# Patient Record
Sex: FEMALE | Race: WHITE | Employment: OTHER | URBAN - METROPOLITAN AREA
[De-identification: names, ages, dates, MRNs, and addresses within clinical notes are randomized per-mention and may not be internally consistent; named-entity substitution may affect disease eponyms.]

---

## 2017-01-10 RX ORDER — POTASSIUM CHLORIDE 750 MG/1
TABLET, EXTENDED RELEASE ORAL
Qty: 180 TABLET | Refills: 1 | Status: SHIPPED | OUTPATIENT
Start: 2017-01-10 | End: 2017-01-27 | Stop reason: ALTCHOICE

## 2017-01-13 RX ORDER — SIMVASTATIN 10 MG
TABLET ORAL
Qty: 90 TABLET | Refills: 1 | Status: SHIPPED | OUTPATIENT
Start: 2017-01-13 | End: 2017-03-21

## 2017-01-19 ENCOUNTER — PATIENT OUTREACH (OUTPATIENT)
Dept: FAMILY MEDICINE CLINIC | Facility: CLINIC | Age: 67
End: 2017-01-19

## 2017-01-19 DIAGNOSIS — E11.9 TYPE 2 DIABETES MELLITUS WITHOUT COMPLICATION, WITHOUT LONG-TERM CURRENT USE OF INSULIN (HCC): Primary | ICD-10-CM

## 2017-01-27 ENCOUNTER — APPOINTMENT (OUTPATIENT)
Dept: LAB | Age: 67
End: 2017-01-27
Attending: FAMILY MEDICINE
Payer: MEDICARE

## 2017-01-27 ENCOUNTER — OFFICE VISIT (OUTPATIENT)
Dept: FAMILY MEDICINE CLINIC | Facility: CLINIC | Age: 67
End: 2017-01-27

## 2017-01-27 VITALS
HEART RATE: 70 BPM | BODY MASS INDEX: 38 KG/M2 | TEMPERATURE: 98 F | SYSTOLIC BLOOD PRESSURE: 120 MMHG | WEIGHT: 222 LBS | OXYGEN SATURATION: 98 % | RESPIRATION RATE: 16 BRPM | DIASTOLIC BLOOD PRESSURE: 82 MMHG

## 2017-01-27 DIAGNOSIS — J00 ACUTE NASOPHARYNGITIS: Primary | ICD-10-CM

## 2017-01-27 DIAGNOSIS — R04.0 EPISTAXIS: ICD-10-CM

## 2017-01-27 DIAGNOSIS — E11.9 TYPE 2 DIABETES MELLITUS WITHOUT COMPLICATION, WITHOUT LONG-TERM CURRENT USE OF INSULIN (HCC): ICD-10-CM

## 2017-01-27 LAB
ALBUMIN SERPL-MCNC: 3.8 G/DL (ref 3.5–4.8)
ALP LIVER SERPL-CCNC: 82 U/L (ref 55–142)
ALT SERPL-CCNC: 27 U/L (ref 14–54)
AST SERPL-CCNC: 17 U/L (ref 15–41)
BILIRUB SERPL-MCNC: 0.4 MG/DL (ref 0.1–2)
BUN BLD-MCNC: 20 MG/DL (ref 8–20)
CALCIUM BLD-MCNC: 9.3 MG/DL (ref 8.3–10.3)
CHLORIDE: 104 MMOL/L (ref 101–111)
CHOLEST SMN-MCNC: 155 MG/DL (ref ?–200)
CO2: 24 MMOL/L (ref 22–32)
CREAT BLD-MCNC: 0.95 MG/DL (ref 0.55–1.02)
CREAT UR-SCNC: 110 MG/DL
EST. AVERAGE GLUCOSE BLD GHB EST-MCNC: 163 MG/DL (ref 68–126)
GLUCOSE BLD-MCNC: 108 MG/DL (ref 70–99)
HBA1C MFR BLD HPLC: 7.3 % (ref ?–5.7)
HDLC SERPL-MCNC: 59 MG/DL (ref 45–?)
HDLC SERPL: 2.63 {RATIO} (ref ?–4.44)
LDLC SERPL CALC-MCNC: 75 MG/DL (ref ?–130)
M PROTEIN MFR SERPL ELPH: 7.6 G/DL (ref 6.1–8.3)
MICROALBUMIN UR-MCNC: 1 MG/DL
MICROALBUMIN/CREAT 24H UR-RTO: 9.1 UG/MG (ref ?–30)
NONHDLC SERPL-MCNC: 96 MG/DL (ref ?–130)
POTASSIUM SERPL-SCNC: 4 MMOL/L (ref 3.6–5.1)
SODIUM SERPL-SCNC: 139 MMOL/L (ref 136–144)
TRIGLYCERIDES: 107 MG/DL (ref ?–150)
VLDL: 21 MG/DL (ref 5–40)

## 2017-01-27 PROCEDURE — 82043 UR ALBUMIN QUANTITATIVE: CPT

## 2017-01-27 PROCEDURE — 82570 ASSAY OF URINE CREATININE: CPT

## 2017-01-27 PROCEDURE — 36415 COLL VENOUS BLD VENIPUNCTURE: CPT

## 2017-01-27 PROCEDURE — 99213 OFFICE O/P EST LOW 20 MIN: CPT | Performed by: NURSE PRACTITIONER

## 2017-01-27 PROCEDURE — 80053 COMPREHEN METABOLIC PANEL: CPT

## 2017-01-27 PROCEDURE — 83036 HEMOGLOBIN GLYCOSYLATED A1C: CPT

## 2017-01-27 PROCEDURE — 80061 LIPID PANEL: CPT

## 2017-01-27 RX ORDER — SERTRALINE HYDROCHLORIDE 25 MG/1
TABLET, FILM COATED ORAL
Qty: 90 TABLET | Refills: 1 | Status: SHIPPED | OUTPATIENT
Start: 2017-01-27 | End: 2017-03-21

## 2017-01-27 RX ORDER — OMEPRAZOLE 20 MG/1
CAPSULE, DELAYED RELEASE ORAL
COMMUNITY
Start: 2017-01-23 | End: 2017-03-21

## 2017-01-27 RX ORDER — CLOPIDOGREL BISULFATE 75 MG/1
TABLET ORAL
Qty: 90 TABLET | Refills: 1 | Status: SHIPPED | OUTPATIENT
Start: 2017-01-27 | End: 2017-11-01

## 2017-01-27 NOTE — PATIENT INSTRUCTIONS
· Please stop Cold and Sinus medication as this is too drying to the nose. · Use Saline nasal spray to help rinse bloody drainage from the nose. · Use Vaseline on a qtip to help keep nares hydrated.  Use humidifier in the bedroom to help hydrate while sle

## 2017-01-27 NOTE — PROGRESS NOTES
HPI:   Hi Nunez is a 78year old female who presents with ill symptoms for  2  weeks. Patient reports cold symptoms started two weeks ago, have improved, but was using Advil cold and sinus medications every day.  She became alarmed as she began to [facility] Unspecified disorder of thyroid    • Seizure disorder ([facility] [address]    • Back problem    • Varicose veins           Past Surgical History    OTHER SURGICAL HISTORY      Comment right shoulder sx    INJECTION, W/WO CONTRAST, DX/THERAPEUTIC SUBSTANCE, EPIDURAL/SUBAR CA OF LARYNX   • Seizure Disorder Son         Smoking Status: Never Smoker                      Smokeless Status: Never Used                        Alcohol Use: Yes                Comment: 3-4 drinks per month        REVIEW OF SYSTEMS:   GENERAL: feels

## 2017-01-30 DIAGNOSIS — E11.9 TYPE 2 DIABETES MELLITUS WITHOUT COMPLICATION, WITHOUT LONG-TERM CURRENT USE OF INSULIN (HCC): ICD-10-CM

## 2017-01-30 NOTE — TELEPHONE ENCOUNTER
From: Yasmany Lundberg  To: Otoniel Soares MD  Sent: 1/30/2017 11:39 AM CST  Subject: Medication Renewal Request    Original authorizing provider: MD Yasmany Carranza would like a refill of the following medications:  Glucose Blood (ONETOUCH ULT

## 2017-02-06 RX ORDER — LEVOTHYROXINE SODIUM 0.05 MG/1
TABLET ORAL
Qty: 90 TABLET | Refills: 1 | Status: SHIPPED | OUTPATIENT
Start: 2017-02-06 | End: 2017-03-21

## 2017-02-07 RX ORDER — GLIPIZIDE 10 MG/1
TABLET, FILM COATED, EXTENDED RELEASE ORAL
Qty: 90 TABLET | Refills: 0 | Status: SHIPPED | OUTPATIENT
Start: 2017-02-07 | End: 2017-04-08

## 2017-02-07 RX ORDER — VALSARTAN AND HYDROCHLOROTHIAZIDE 160; 25 MG/1; MG/1
TABLET ORAL
Qty: 90 TABLET | Refills: 0 | Status: SHIPPED | OUTPATIENT
Start: 2017-02-07 | End: 2017-04-08

## 2017-03-21 ENCOUNTER — OFFICE VISIT (OUTPATIENT)
Dept: FAMILY MEDICINE CLINIC | Facility: CLINIC | Age: 67
End: 2017-03-21

## 2017-03-21 VITALS
OXYGEN SATURATION: 98 % | WEIGHT: 227 LBS | HEART RATE: 70 BPM | DIASTOLIC BLOOD PRESSURE: 80 MMHG | RESPIRATION RATE: 16 BRPM | BODY MASS INDEX: 39 KG/M2 | SYSTOLIC BLOOD PRESSURE: 112 MMHG

## 2017-03-21 DIAGNOSIS — R53.82 CHRONIC FATIGUE: ICD-10-CM

## 2017-03-21 DIAGNOSIS — R39.15 URINARY URGENCY: ICD-10-CM

## 2017-03-21 DIAGNOSIS — E11.9 TYPE 2 DIABETES MELLITUS WITHOUT COMPLICATION, WITHOUT LONG-TERM CURRENT USE OF INSULIN (HCC): Primary | ICD-10-CM

## 2017-03-21 DIAGNOSIS — E03.9 HYPOTHYROIDISM, UNSPECIFIED TYPE: ICD-10-CM

## 2017-03-21 DIAGNOSIS — Z12.31 ENCOUNTER FOR SCREENING MAMMOGRAM FOR MALIGNANT NEOPLASM OF BREAST: ICD-10-CM

## 2017-03-21 PROCEDURE — 99214 OFFICE O/P EST MOD 30 MIN: CPT | Performed by: FAMILY MEDICINE

## 2017-03-21 RX ORDER — LEVOTHYROXINE SODIUM 0.07 MG/1
75 TABLET ORAL
Qty: 90 TABLET | Refills: 0 | Status: SHIPPED | OUTPATIENT
Start: 2017-03-21 | End: 2017-04-21

## 2017-03-21 RX ORDER — ASCORBIC ACID 500 MG
500 TABLET ORAL DAILY
COMMUNITY

## 2017-03-21 NOTE — PROGRESS NOTES
[facility] Family [facility] Office Note  Chief Complaint:   Patient presents with:  Diabetic Flu: discuss weight gain and c/o fatigue       HPI:   This is a 78year old female coming in for  HPI  Follow up for diabetes.   Pt has been checking feet 06/10/2014 6.7*   02/18/2014 6.6*   11/25/2013 6.3*   ----------              Past Medical History   Diagnosis Date   • Type II or unspecified type diabetes mellitus without mention of complication, not stated as uncontrolled    • Other and unspecified h POSSIBLE BIOPSY, POSSIBLE POLYPECTOMY 07734;  Surgeon: Kayley Berry MD;  Location: [facility]    COLONOSCOPY N/A 12/13/2016    Comment Procedure: COLONOSCOPY, POSSIBLE BIOPSY, POSSIBLE POLYPECTOMY 60736;  Surgeon: Kayley Berry MD;  Location: [facility] [DISCONTINUED] SIMVASTATIN 10 MG Oral Tab TAKE 1 TABLET EVERY NIGHT Disp: 90 tablet Rfl: 1      Counseling given: Not Answered       REVIEW OF SYSTEMS:   Review of Systems   Constitutional: Negative for fever and chills.    HENT: Negative for rhinorrhea a well.      ASSESSMENT AND PLAN:   1. Type 2 diabetes mellitus without complication, without long-term current use of insulin (HCC)  - Hemoglobin A1C [E];  Future  Foot exam completed  Pt has eye exam scheduled  Pt wants to try more aggressive weight loss pr

## 2017-03-22 RX ORDER — OMEPRAZOLE 20 MG/1
CAPSULE, DELAYED RELEASE ORAL
Qty: 180 CAPSULE | Refills: 5 | Status: SHIPPED | OUTPATIENT
Start: 2017-03-22 | End: 2018-05-03

## 2017-03-27 ENCOUNTER — PATIENT MESSAGE (OUTPATIENT)
Dept: FAMILY MEDICINE CLINIC | Facility: CLINIC | Age: 67
End: 2017-03-27

## 2017-03-28 NOTE — TELEPHONE ENCOUNTER
From: Raleigh Gan  To: Chalino Reynaga MD  Sent: 3/27/2017 10:50 AM CDT  Subject: Visit Melissa Albert,     I started taking the sample you gave me to try of Myrbetriq and it worked right away, no accidents at all these days.  So a pres

## 2017-03-30 ENCOUNTER — PATIENT OUTREACH (OUTPATIENT)
Dept: CASE MANAGEMENT | Age: 67
End: 2017-03-30

## 2017-03-30 RX ORDER — ACETAMINOPHEN AND CODEINE PHOSPHATE 300; 30 MG/1; MG/1
1 TABLET ORAL EVERY 8 HOURS PRN
Qty: 30 TABLET | Refills: 0 | Status: SHIPPED
Start: 2017-03-30 | End: 2017-08-29

## 2017-03-31 ENCOUNTER — OFFICE VISIT (OUTPATIENT)
Dept: FAMILY MEDICINE CLINIC | Facility: CLINIC | Age: 67
End: 2017-03-31

## 2017-03-31 ENCOUNTER — HOSPITAL ENCOUNTER (OUTPATIENT)
Dept: GENERAL RADIOLOGY | Age: 67
Discharge: HOME | End: 2017-03-31
Attending: FAMILY MEDICINE
Payer: MEDICARE

## 2017-03-31 VITALS
HEART RATE: 74 BPM | DIASTOLIC BLOOD PRESSURE: 90 MMHG | SYSTOLIC BLOOD PRESSURE: 140 MMHG | RESPIRATION RATE: 16 BRPM | OXYGEN SATURATION: 99 % | TEMPERATURE: 98 F

## 2017-03-31 DIAGNOSIS — M54.16 LUMBAR RADICULITIS: Primary | ICD-10-CM

## 2017-03-31 DIAGNOSIS — M53.3 DISORDER OF SI (SACROILIAC) JOINT: ICD-10-CM

## 2017-03-31 PROCEDURE — 72202 X-RAY EXAM SI JOINTS 3/> VWS: CPT

## 2017-03-31 PROCEDURE — 99214 OFFICE O/P EST MOD 30 MIN: CPT | Performed by: FAMILY MEDICINE

## 2017-03-31 RX ORDER — GABAPENTIN 300 MG/1
300 CAPSULE ORAL 3 TIMES DAILY
Qty: 90 CAPSULE | Refills: 0 | Status: SHIPPED | OUTPATIENT
Start: 2017-03-31 | End: 2017-04-24

## 2017-03-31 RX ORDER — GABAPENTIN 300 MG/1
300 CAPSULE ORAL 3 TIMES DAILY
Qty: 90 CAPSULE | Refills: 0 | Status: SHIPPED | OUTPATIENT
Start: 2017-03-31 | End: 2017-03-31

## 2017-03-31 RX ORDER — POTASSIUM CHLORIDE 750 MG/1
10 TABLET, EXTENDED RELEASE ORAL DAILY
COMMUNITY
Start: 2017-03-27 | End: 2017-04-12

## 2017-03-31 RX ORDER — PREDNISONE 20 MG/1
40 TABLET ORAL DAILY
Qty: 10 TABLET | Refills: 0 | Status: SHIPPED | OUTPATIENT
Start: 2017-03-31 | End: 2017-04-05

## 2017-03-31 NOTE — PROGRESS NOTES
[facility] Office Note  Chief Complaint:   Patient presents with:  Back Pain: lower back pain and travels down into right thigh x 1week      HPI:   This is a 78year old female coming in for  HPI  The patient complaints of back pa Comment L3-L5 PPS TLIF     UPPER GI ENDOSCOPY,BIOPSY N/A 5/25/2016    Comment Procedure: ESOPHAGOGASTRODUODENOSCOPY, POSSIBLE BIOPSY, POSSIBLE POLYPECTOMY 55094;  Surgeon: Jennifer Granger MD;  Location: [facility]    COLONOSCOPY FLX W/ENDOSCOPIC MUCOSAL R Levothyroxine Sodium (LEVOTHROID) 75 MCG Oral Tab Take 1 tablet (75 mcg total) by mouth before breakfast. Disp: 90 tablet Rfl: 0   Mirabegron ER (MYRBETRIQ) 25 MG Oral Tablet 24 Hr Take 1 tablet (25 mg total) by mouth daily.  Disp: 14 tablet Rfl: 0   VALSAR /90 mmHg  Pulse 74  Temp(Src) 98 °F (36.7 °C) (Oral)  Resp 16  SpO2 99% Estimated body mass index is 38.95 kg/(m^2) as calculated from the following:    Height as of 12/13/16: 64\". Weight as of 3/21/17: 227 lb. Vital signs reviewed. Appears stat Lumbar spondylosis     Lumbar stenosis     Spondylolisthesis of lumbar region     Lumbar radiculitis     UTI (lower urinary tract infection)     HTN (hypertension)     Seizure disorder (HCC)     DM (diabetes mellitus) ([address]     Hyperlipidemia

## 2017-04-10 RX ORDER — VALSARTAN AND HYDROCHLOROTHIAZIDE 160; 25 MG/1; MG/1
TABLET ORAL
Qty: 90 TABLET | Refills: 0 | Status: SHIPPED | OUTPATIENT
Start: 2017-04-10 | End: 2017-10-09

## 2017-04-10 RX ORDER — GLIPIZIDE 10 MG/1
TABLET, FILM COATED, EXTENDED RELEASE ORAL
Qty: 90 TABLET | Refills: 0 | Status: SHIPPED | OUTPATIENT
Start: 2017-04-10 | End: 2017-09-13

## 2017-04-12 ENCOUNTER — PATIENT OUTREACH (OUTPATIENT)
Dept: CASE MANAGEMENT | Age: 67
End: 2017-04-12

## 2017-04-12 DIAGNOSIS — E11.9 TYPE 2 DIABETES MELLITUS WITHOUT COMPLICATION, WITHOUT LONG-TERM CURRENT USE OF INSULIN (HCC): Primary | ICD-10-CM

## 2017-04-12 DIAGNOSIS — I10 ESSENTIAL HYPERTENSION: ICD-10-CM

## 2017-04-12 DIAGNOSIS — E78.5 HYPERLIPIDEMIA, UNSPECIFIED HYPERLIPIDEMIA TYPE: ICD-10-CM

## 2017-04-12 PROCEDURE — 99490 CHRNC CARE MGMT STAFF 1ST 20: CPT

## 2017-04-12 NOTE — PROGRESS NOTES
4/12/2017  Spoke to Tiffany at length about CCM, current care plan and performed CCM assessment with Tiffany reviewed meds and compliance.  Reviewed pt Type 2 Diabetes, HTN  Health Maintenance: mammogram-due this year, colonoscopy- May and December 2016, Due

## 2017-04-19 ENCOUNTER — HOSPITAL ENCOUNTER (OUTPATIENT)
Dept: MAMMOGRAPHY | Age: 67
Discharge: HOME | End: 2017-04-19
Attending: FAMILY MEDICINE
Payer: MEDICARE

## 2017-04-19 DIAGNOSIS — Z12.31 ENCOUNTER FOR SCREENING MAMMOGRAM FOR MALIGNANT NEOPLASM OF BREAST: ICD-10-CM

## 2017-04-19 PROCEDURE — 77067 SCR MAMMO BI INCL CAD: CPT

## 2017-04-21 RX ORDER — LEVOTHYROXINE SODIUM 0.07 MG/1
TABLET ORAL
Qty: 90 TABLET | Refills: 0 | Status: SHIPPED | OUTPATIENT
Start: 2017-04-21 | End: 2017-07-26

## 2017-04-24 RX ORDER — GABAPENTIN 300 MG/1
CAPSULE ORAL
Qty: 90 CAPSULE | Refills: 1 | Status: SHIPPED | OUTPATIENT
Start: 2017-04-24 | End: 2017-05-16

## 2017-05-05 ENCOUNTER — APPOINTMENT (OUTPATIENT)
Dept: LAB | Age: 67
End: 2017-05-05
Attending: FAMILY MEDICINE
Payer: MEDICARE

## 2017-05-05 DIAGNOSIS — E03.9 HYPOTHYROIDISM, UNSPECIFIED TYPE: ICD-10-CM

## 2017-05-05 DIAGNOSIS — R53.82 CHRONIC FATIGUE: ICD-10-CM

## 2017-05-05 DIAGNOSIS — E11.9 TYPE 2 DIABETES MELLITUS WITHOUT COMPLICATION, WITHOUT LONG-TERM CURRENT USE OF INSULIN (HCC): ICD-10-CM

## 2017-05-05 PROCEDURE — 82607 VITAMIN B-12: CPT

## 2017-05-05 PROCEDURE — 84439 ASSAY OF FREE THYROXINE: CPT

## 2017-05-05 PROCEDURE — 84443 ASSAY THYROID STIM HORMONE: CPT

## 2017-05-05 PROCEDURE — 83036 HEMOGLOBIN GLYCOSYLATED A1C: CPT

## 2017-05-12 ENCOUNTER — PATIENT OUTREACH (OUTPATIENT)
Dept: CASE MANAGEMENT | Age: 67
End: 2017-05-12

## 2017-05-15 ENCOUNTER — PATIENT OUTREACH (OUTPATIENT)
Dept: CASE MANAGEMENT | Age: 67
End: 2017-05-15

## 2017-05-15 DIAGNOSIS — E78.5 HYPERLIPIDEMIA, UNSPECIFIED HYPERLIPIDEMIA TYPE: ICD-10-CM

## 2017-05-15 DIAGNOSIS — I10 ESSENTIAL HYPERTENSION: ICD-10-CM

## 2017-05-15 DIAGNOSIS — E11.9 TYPE 2 DIABETES MELLITUS WITHOUT COMPLICATION, WITHOUT LONG-TERM CURRENT USE OF INSULIN (HCC): Primary | ICD-10-CM

## 2017-05-15 PROCEDURE — 99490 CHRNC CARE MGMT STAFF 1ST 20: CPT

## 2017-05-15 NOTE — PROGRESS NOTES
Patient returned my call. Patient stated that she is upset with her A1C and her weight. I asked patient if she had looked through the information I had sent her and she mentioned that she did glimpse through some of it.  I asked patient if she did any type

## 2017-05-16 ENCOUNTER — OFFICE VISIT (OUTPATIENT)
Dept: FAMILY MEDICINE CLINIC | Facility: CLINIC | Age: 67
End: 2017-05-16

## 2017-05-16 VITALS
WEIGHT: 221 LBS | BODY MASS INDEX: 38 KG/M2 | DIASTOLIC BLOOD PRESSURE: 80 MMHG | RESPIRATION RATE: 16 BRPM | OXYGEN SATURATION: 98 % | HEART RATE: 80 BPM | SYSTOLIC BLOOD PRESSURE: 130 MMHG

## 2017-05-16 DIAGNOSIS — E78.5 HYPERLIPIDEMIA, UNSPECIFIED HYPERLIPIDEMIA TYPE: ICD-10-CM

## 2017-05-16 DIAGNOSIS — E53.8 VITAMIN B12 DEFICIENCY: ICD-10-CM

## 2017-05-16 DIAGNOSIS — M79.671 PAIN IN BOTH FEET: ICD-10-CM

## 2017-05-16 DIAGNOSIS — M79.672 PAIN IN BOTH FEET: ICD-10-CM

## 2017-05-16 DIAGNOSIS — E11.9 TYPE 2 DIABETES MELLITUS WITHOUT COMPLICATION, WITHOUT LONG-TERM CURRENT USE OF INSULIN (HCC): Primary | ICD-10-CM

## 2017-05-16 DIAGNOSIS — G89.29 CHRONIC RIGHT-SIDED LOW BACK PAIN WITHOUT SCIATICA: ICD-10-CM

## 2017-05-16 DIAGNOSIS — M54.50 CHRONIC RIGHT-SIDED LOW BACK PAIN WITHOUT SCIATICA: ICD-10-CM

## 2017-05-16 PROCEDURE — 99214 OFFICE O/P EST MOD 30 MIN: CPT | Performed by: FAMILY MEDICINE

## 2017-05-16 PROCEDURE — 96372 THER/PROPH/DIAG INJ SC/IM: CPT | Performed by: FAMILY MEDICINE

## 2017-05-16 RX ORDER — CYANOCOBALAMIN 1000 UG/ML
1000 INJECTION INTRAMUSCULAR; SUBCUTANEOUS ONCE
Status: COMPLETED | OUTPATIENT
Start: 2017-05-16 | End: 2017-05-16

## 2017-05-16 RX ORDER — SIMVASTATIN 10 MG
10 TABLET ORAL NIGHTLY
COMMUNITY
Start: 2017-04-05 | End: 2017-08-27

## 2017-05-16 RX ADMIN — CYANOCOBALAMIN 1000 MCG: 1000 INJECTION INTRAMUSCULAR; SUBCUTANEOUS at 12:01:00

## 2017-05-16 NOTE — PROGRESS NOTES
[facility] Family Medicine Office Note  Chief Complaint:   Patient presents with:  Diabetic Flu: DM follow up go over lab results       HPI:   This is a 78year old female coming in for  HPI  Diabetes  A1c increased.  States she does not want to b Fawad Stevens NDL/CATH SPI DX/THER NJX  7/18/2013    Comment Procedure: LUMBAR / TRANSFORAMINAL EPIDURAL STEROID INJECTION;  Surgeon: Alize Jarvis MD;  Location: [address] [place] [address]&STRIPPING [address] [place] [address] right leg 1 TABLET (75 MCG TOTAL) BY MOUTH BEFORE BREAKFAST.  Disp: 90 tablet Rfl: 0   GLIPIZIDE ER 10 MG Oral Tablet 24 Hr TAKE 1 TABLET EVERY DAY Disp: 90 tablet Rfl: 0   VALSARTAN-HYDROCHLOROTHIAZIDE 160-25 MG Oral Tab TAKE 1 TABLET EVERY DAY Disp: 90 tablet Rfl: 64\".    Weight as of this encounter: 221 lb. Vital signs reviewed. Appears stated age, well groomed. Physical Exam   Constitutional: She is oriented to person, place, and time. She appears well-developed and well-nourished.    HENT:   Mouth/Throat: Oroph verbalizes understanding. Patient is notified to call with any questions, complications, allergies, or worsening or changing symptoms. Patient is to call with any side effects or complications from the treatments as a result of today.      Problem List:  P

## 2017-05-19 ENCOUNTER — TELEPHONE (OUTPATIENT)
Dept: FAMILY MEDICINE CLINIC | Facility: CLINIC | Age: 67
End: 2017-05-19

## 2017-06-07 PROBLEM — M79.675 PAIN IN TOES OF BOTH FEET: Status: ACTIVE | Noted: 2017-06-07

## 2017-06-07 PROBLEM — B35.1 ONYCHOMYCOSIS OF TOENAIL: Status: ACTIVE | Noted: 2017-06-07

## 2017-06-07 PROBLEM — L84 TYLOMA: Status: ACTIVE | Noted: 2017-06-07

## 2017-06-07 PROBLEM — M79.674 PAIN IN TOES OF BOTH FEET: Status: ACTIVE | Noted: 2017-06-07

## 2017-06-07 PROBLEM — E11.51 DIABETES MELLITUS WITH PERIPHERAL CIRCULATORY DISORDER (HCC): Status: ACTIVE | Noted: 2017-06-07

## 2017-06-09 ENCOUNTER — PATIENT OUTREACH (OUTPATIENT)
Dept: CASE MANAGEMENT | Age: 67
End: 2017-06-09

## 2017-06-12 ENCOUNTER — PATIENT OUTREACH (OUTPATIENT)
Dept: CASE MANAGEMENT | Age: 67
End: 2017-06-12

## 2017-06-12 ENCOUNTER — TELEPHONE (OUTPATIENT)
Dept: FAMILY MEDICINE CLINIC | Facility: CLINIC | Age: 67
End: 2017-06-12

## 2017-06-12 DIAGNOSIS — E11.51 DIABETES MELLITUS WITH PERIPHERAL CIRCULATORY DISORDER (HCC): Primary | ICD-10-CM

## 2017-06-12 DIAGNOSIS — E78.5 HYPERLIPIDEMIA, UNSPECIFIED HYPERLIPIDEMIA TYPE: ICD-10-CM

## 2017-06-12 DIAGNOSIS — G25.0 ESSENTIAL TREMOR: ICD-10-CM

## 2017-06-12 PROCEDURE — 99490 CHRNC CARE MGMT STAFF 1ST 20: CPT

## 2017-06-12 RX ORDER — MONTELUKAST SODIUM 10 MG/1
10 TABLET ORAL NIGHTLY
Qty: 30 TABLET | Refills: 0 | Status: SHIPPED | OUTPATIENT
Start: 2017-06-12 | End: 2017-07-03

## 2017-06-12 NOTE — PROGRESS NOTES
I called and spoke with patient regarding information I had mailed her. Patient states that she really like the information and has been doing the recommendations.  She said it's a little hard because she is used to eating so much food and cutting back is n

## 2017-06-12 NOTE — TELEPHONE ENCOUNTER
Patient has been dealing with allergies for a little over a week now. She has tried OTC allegra, andrea seltzer, and a generic nasal spray. She states that nothing is working. Patient is not experiencing green mucous. No fevers or sinus pressure/pain.  Patien

## 2017-06-12 NOTE — TELEPHONE ENCOUNTER
Trial of singulair, 10mg daily. Disp #30  Continue otc allegra. It may take a few days - weeks for symptoms to improve.

## 2017-06-12 NOTE — PROGRESS NOTES
PCP wants patient to continue with OTC allegra and adding singulair 10 mg daily. Patient notified of PCP instructions. Medication was sent to pharmacy.  Patient states understanding    Time spent collecting and reviewing patient data, coordination of care 2

## 2017-06-15 ENCOUNTER — NURSE ONLY (OUTPATIENT)
Dept: FAMILY MEDICINE CLINIC | Facility: CLINIC | Age: 67
End: 2017-06-15

## 2017-06-15 DIAGNOSIS — E53.8 B12 DEFICIENCY: Primary | ICD-10-CM

## 2017-06-15 PROCEDURE — 96372 THER/PROPH/DIAG INJ SC/IM: CPT | Performed by: FAMILY MEDICINE

## 2017-06-15 RX ORDER — CYANOCOBALAMIN 1000 UG/ML
1000 INJECTION INTRAMUSCULAR; SUBCUTANEOUS ONCE
Status: COMPLETED | OUTPATIENT
Start: 2017-06-15 | End: 2017-06-15

## 2017-06-15 RX ADMIN — CYANOCOBALAMIN 1000 MCG: 1000 INJECTION INTRAMUSCULAR; SUBCUTANEOUS at 15:17:00

## 2017-07-03 ENCOUNTER — MED REC SCAN ONLY (OUTPATIENT)
Dept: FAMILY MEDICINE CLINIC | Facility: CLINIC | Age: 67
End: 2017-07-03

## 2017-07-03 ENCOUNTER — OFFICE VISIT (OUTPATIENT)
Dept: FAMILY MEDICINE CLINIC | Facility: CLINIC | Age: 67
End: 2017-07-03

## 2017-07-03 ENCOUNTER — TELEPHONE (OUTPATIENT)
Dept: FAMILY MEDICINE CLINIC | Facility: CLINIC | Age: 67
End: 2017-07-03

## 2017-07-03 VITALS
OXYGEN SATURATION: 98 % | WEIGHT: 220 LBS | RESPIRATION RATE: 16 BRPM | SYSTOLIC BLOOD PRESSURE: 124 MMHG | DIASTOLIC BLOOD PRESSURE: 80 MMHG | BODY MASS INDEX: 38 KG/M2 | HEART RATE: 60 BPM

## 2017-07-03 DIAGNOSIS — E04.1 THYROID NODULE: ICD-10-CM

## 2017-07-03 DIAGNOSIS — R09.82 POST-NASAL DRIP: Primary | ICD-10-CM

## 2017-07-03 PROCEDURE — 99213 OFFICE O/P EST LOW 20 MIN: CPT | Performed by: FAMILY MEDICINE

## 2017-07-03 RX ORDER — AZELASTINE HCL 205.5 UG/1
1 SPRAY NASAL DAILY
Qty: 30 ML | Refills: 2 | Status: SHIPPED | OUTPATIENT
Start: 2017-07-03 | End: 2017-07-05 | Stop reason: DRUGHIGH

## 2017-07-03 RX ORDER — MONTELUKAST SODIUM 10 MG/1
10 TABLET ORAL NIGHTLY
Qty: 30 TABLET | Refills: 0 | Status: SHIPPED | OUTPATIENT
Start: 2017-07-03 | End: 2017-08-02 | Stop reason: ALTCHOICE

## 2017-07-05 RX ORDER — AZELASTINE 1 MG/ML
1 SPRAY, METERED NASAL DAILY
Qty: 30 ML | Refills: 2 | Status: SHIPPED | OUTPATIENT
Start: 2017-07-05 | End: 2017-08-02 | Stop reason: ALTCHOICE

## 2017-07-05 NOTE — PROGRESS NOTES
China Harding is a 75year old female. Patient presents with: Follow - Up: follow up discuss singulair       HPI:   Follow up     1. Mucous congestion  Throat clearing every day. Worse first thing in the morning. Coughs up phlegm every morning.  Has been EPIDURAL                STEROID INJECTION;  Surgeon: Galindo Vasquez MD;  Location: [address] [place] [address]: [employer]&STRIPPING SHORT SAPHENOUS VEIN      Comment: right leg  No date: OTHER SURGICAL HISTORY      Comment: right shoulde EVERY DAY Disp: 90 tablet Rfl: 0   METFORMIN HCL 1000 MG Oral Tab TAKE 1 TABLET TWICE DAILY WITH MEALS Disp: 180 tablet Rfl: 0   Acetaminophen-Codeine #3 300-30 MG Oral Tab Take 1 tablet by mouth every 8 (eight) hours as needed for Pain.  Disp: 30 tablet Rf right, no thyromegaly or nodules  LUNGS: clear to auscultation, no wheezing or rhonchi or rales  CARDIO: RRR without murmur  GI: soft, good BS's,no masses, HSM or tenderness  EXTREMITIES: no cyanosis, clubbing or edema  PSYCH: normal mood and affect    ASS

## 2017-07-07 ENCOUNTER — HOSPITAL ENCOUNTER (OUTPATIENT)
Dept: ULTRASOUND IMAGING | Age: 67
Discharge: HOME | End: 2017-07-07
Attending: FAMILY MEDICINE
Payer: MEDICARE

## 2017-07-07 DIAGNOSIS — E04.1 THYROID NODULE: ICD-10-CM

## 2017-07-07 PROCEDURE — 76536 US EXAM OF HEAD AND NECK: CPT | Performed by: FAMILY MEDICINE

## 2017-07-12 ENCOUNTER — PATIENT OUTREACH (OUTPATIENT)
Dept: CASE MANAGEMENT | Age: 67
End: 2017-07-12

## 2017-07-12 DIAGNOSIS — G25.0 ESSENTIAL TREMOR: ICD-10-CM

## 2017-07-12 DIAGNOSIS — E78.5 HYPERLIPIDEMIA, UNSPECIFIED HYPERLIPIDEMIA TYPE: ICD-10-CM

## 2017-07-12 DIAGNOSIS — E11.51 DIABETES MELLITUS WITH PERIPHERAL CIRCULATORY DISORDER (HCC): ICD-10-CM

## 2017-07-12 PROCEDURE — 99490 CHRNC CARE MGMT STAFF 1ST 20: CPT

## 2017-07-12 NOTE — PROGRESS NOTES
I called and spoke with pt to see how she has been doing. Pt recently saw PCP regarding her cough she has been battling. Pt stated that she is taking singulair which she thinks it is not helping. Pt had a f/u US done on her thyroid nodule.  US showed some g

## 2017-07-13 DIAGNOSIS — E11.9 TYPE 2 DIABETES MELLITUS WITHOUT COMPLICATION, WITHOUT LONG-TERM CURRENT USE OF INSULIN (HCC): ICD-10-CM

## 2017-07-15 ENCOUNTER — OFFICE VISIT (OUTPATIENT)
Dept: FAMILY MEDICINE CLINIC | Facility: CLINIC | Age: 67
End: 2017-07-15

## 2017-07-15 DIAGNOSIS — E53.8 VITAMIN B 12 DEFICIENCY: Primary | ICD-10-CM

## 2017-07-15 PROCEDURE — 96372 THER/PROPH/DIAG INJ SC/IM: CPT | Performed by: FAMILY MEDICINE

## 2017-07-15 RX ORDER — CYANOCOBALAMIN 1000 UG/ML
1000 INJECTION INTRAMUSCULAR; SUBCUTANEOUS ONCE
Status: COMPLETED | OUTPATIENT
Start: 2017-07-15 | End: 2017-07-15

## 2017-07-15 RX ADMIN — CYANOCOBALAMIN 1000 MCG: 1000 INJECTION INTRAMUSCULAR; SUBCUTANEOUS at 09:25:00

## 2017-07-26 RX ORDER — LEVOTHYROXINE SODIUM 0.07 MG/1
TABLET ORAL
Qty: 90 TABLET | Refills: 0 | Status: SHIPPED | OUTPATIENT
Start: 2017-07-26 | End: 2018-01-12

## 2017-07-28 ENCOUNTER — PATIENT OUTREACH (OUTPATIENT)
Dept: CASE MANAGEMENT | Age: 67
End: 2017-07-28

## 2017-07-28 NOTE — PROGRESS NOTES
Coordination of education, review of chart, update to record, sending materials:[employer]  Time: 4 min    Total monthly time 24 minutes

## 2017-08-02 ENCOUNTER — PATIENT OUTREACH (OUTPATIENT)
Dept: CASE MANAGEMENT | Age: 67
End: 2017-08-02

## 2017-08-02 DIAGNOSIS — I10 ESSENTIAL HYPERTENSION: ICD-10-CM

## 2017-08-02 DIAGNOSIS — E11.51 DIABETES MELLITUS WITH PERIPHERAL CIRCULATORY DISORDER (HCC): ICD-10-CM

## 2017-08-02 DIAGNOSIS — E78.5 HYPERLIPIDEMIA, UNSPECIFIED HYPERLIPIDEMIA TYPE: ICD-10-CM

## 2017-08-02 PROCEDURE — 99490 CHRNC CARE MGMT STAFF 1ST 20: CPT

## 2017-08-02 NOTE — PROGRESS NOTES
Pt called me to update her plan of care after seeing her ENT. Pt stated that he started her on a new nasal spray which has been really well for her. She no longer has her cough. She discontinued her other nasal spray.  The ENT wants her to get another US of

## 2017-08-14 NOTE — TELEPHONE ENCOUNTER
Medication failed protocol please approve or deny  LOV- 5/16/2017  Last refilled- 1/6/2015 #180 1 RF

## 2017-08-15 ENCOUNTER — NURSE ONLY (OUTPATIENT)
Dept: FAMILY MEDICINE CLINIC | Facility: CLINIC | Age: 67
End: 2017-08-15

## 2017-08-15 DIAGNOSIS — E53.8 B12 DEFICIENCY: Primary | ICD-10-CM

## 2017-08-15 PROCEDURE — 96372 THER/PROPH/DIAG INJ SC/IM: CPT | Performed by: PHYSICIAN ASSISTANT

## 2017-08-15 RX ORDER — POTASSIUM CHLORIDE 750 MG/1
TABLET, FILM COATED, EXTENDED RELEASE ORAL
Qty: 180 TABLET | Refills: 1 | Status: SHIPPED | OUTPATIENT
Start: 2017-08-15 | End: 2017-08-29

## 2017-08-15 RX ORDER — CYANOCOBALAMIN 1000 UG/ML
1000 INJECTION INTRAMUSCULAR; SUBCUTANEOUS ONCE
Status: COMPLETED | OUTPATIENT
Start: 2017-08-15 | End: 2017-08-15

## 2017-08-15 RX ADMIN — CYANOCOBALAMIN 1000 MCG: 1000 INJECTION INTRAMUSCULAR; SUBCUTANEOUS at 08:30:00

## 2017-08-15 NOTE — TELEPHONE ENCOUNTER
Called to inform patient of message below, patient states she will be in later this month for blood work.

## 2017-08-27 RX ORDER — SIMVASTATIN 10 MG
TABLET ORAL
Qty: 90 TABLET | Refills: 1 | Status: SHIPPED | OUTPATIENT
Start: 2017-08-27 | End: 2018-01-20

## 2017-08-29 ENCOUNTER — OFFICE VISIT (OUTPATIENT)
Dept: FAMILY MEDICINE CLINIC | Facility: CLINIC | Age: 67
End: 2017-08-29

## 2017-08-29 VITALS
OXYGEN SATURATION: 98 % | SYSTOLIC BLOOD PRESSURE: 122 MMHG | RESPIRATION RATE: 16 BRPM | HEART RATE: 80 BPM | BODY MASS INDEX: 36.32 KG/M2 | WEIGHT: 218 LBS | DIASTOLIC BLOOD PRESSURE: 80 MMHG | HEIGHT: 65 IN

## 2017-08-29 DIAGNOSIS — I10 ESSENTIAL HYPERTENSION: ICD-10-CM

## 2017-08-29 DIAGNOSIS — E11.9 TYPE 2 DIABETES MELLITUS WITHOUT COMPLICATION, WITHOUT LONG-TERM CURRENT USE OF INSULIN (HCC): ICD-10-CM

## 2017-08-29 DIAGNOSIS — Z00.00 ENCOUNTER FOR ANNUAL HEALTH EXAMINATION: Primary | ICD-10-CM

## 2017-08-29 DIAGNOSIS — Z23 NEED FOR PNEUMOCOCCAL VACCINATION: ICD-10-CM

## 2017-08-29 PROCEDURE — G0009 ADMIN PNEUMOCOCCAL VACCINE: HCPCS | Performed by: FAMILY MEDICINE

## 2017-08-29 PROCEDURE — 90732 PPSV23 VACC 2 YRS+ SUBQ/IM: CPT | Performed by: FAMILY MEDICINE

## 2017-08-29 PROCEDURE — G0439 PPPS, SUBSEQ VISIT: HCPCS | Performed by: FAMILY MEDICINE

## 2017-08-29 NOTE — PROGRESS NOTES
HPI:   Zoie Alaniz is a 75year old female who presents for a Medicare Subsequent Annual Wellness visit (Pt already had Initial Annual Wellness).     DEXA Scan due on 05/22/2014  Diabetes Care A1C due on 08/05/2017  Fall Risk Screening due on 08/29/2017 11.5 (L) 08/30/2016   .0 08/30/2016        ALLERGIES:   She has No Known Allergies.     CURRENT MEDICATIONS:     Outpatient Prescriptions Marked as Taking for the 8/29/17 encounter (Office Visit) with El Rico MD:  SIMVASTATIN 10 MG Oral Tab CLAU surgery (11-25-13); upper gi endoscopy,biopsy (N/A, 5/25/2016); colonoscopy flx w/endoscopic mucosal resection (N/A, 5/25/2016); patient withough preoperative order for iv antibiotic surgical site infection prophylaxis.  (N/A, 5/25/2016); patient documented midline,mucosa normal, no drainage or sinus tenderness   Throat: Lips, mucosa, and tongue normal; teeth and gums normal   Neck: Supple, symmetrical, trachea midline, no adenopathy;  thyroid: not enlarged, symmetric, no tenderness/mass/nodules; no carotid b present management         Ms. Dav Garza already takes aspirin and has it on her medication list.     Diet assessment: good       PLAN:  The patient indicates understanding of these issues and agrees to the plan. No Follow-up on file.      Brandyn Kee MD, 8 any memory issues?: 0-No    Fall/Risk Scoring: 3    Scoring Interpretation: 0 - 3 No Risk     Depression Screening (PHQ-2/PHQ-9): Over the LAST 2 WEEKS   Little interest or pleasure in doing things (over the last two weeks)?: Not at all    Feeling down, de Density Screening      Dexascan Every two years Last Dexa Scan:   DEXA - BONE DENSITOMETRY     No flowsheet data found.     Pap and Pelvic      Pap: Every 3 yrs age 21-65 or Pap+HPV every 5 yrs age 33-67, age 72 and older at high risk There are no preventiv 11/16/2012 3.2 (L)    No flowsheet data found. Creatinine  Annually CREATININE (mg/dL)   Date Value   02/18/2014 0.67     Creatinine (mg/dL)   Date Value   01/27/2017 0.95    No flowsheet data found.     BUN  Annually BUN (mg/dL)   Date Value   01/27/2

## 2017-08-29 NOTE — PATIENT INSTRUCTIONS
Get your Flu Shot in October - call and schedule a Nurse Visit  Get your B12 level checked in November 2-3 days before your B12 shot would be due  Follow up with Dr. Isiah Mantilla regarding repeating a colonoscopy in December  Send us a copy of your DEXA scan resul (mg/dL)   Date Value   02/18/2014 169     Cholesterol, Total (mg/dL)   Date Value   01/27/2017 155     TRIGLYCERIDES (mg/dL)   Date Value   02/18/2014 66     Triglycerides (mg/dL)   Date Value   01/27/2017 107        EKG - covered if needed at [facility] data found.     Recommended for 2 year anniversary or as ordered in After Visit Summary   Pap and Pelvic      Pap: Every 3 yrs age 21-65 or Pap+HPV every 5 yrs age 33-67, Covered every 2 yrs up to age 79 or Yearly if High Risk   There are no preventive care (Not covered by Medicare Part B) No orders found for this or any previous visit. This may be covered with your pharmacy  prescription benefits     Recommended Websites for Advanced Directives    [email or web address] org/publications/[email or web address]/personal_[email or web address]. pdf

## 2017-09-07 ENCOUNTER — APPOINTMENT (OUTPATIENT)
Dept: LAB | Age: 67
End: 2017-09-07
Attending: FAMILY MEDICINE
Payer: MEDICARE

## 2017-09-07 DIAGNOSIS — E78.5 HYPERLIPIDEMIA, UNSPECIFIED HYPERLIPIDEMIA TYPE: ICD-10-CM

## 2017-09-07 DIAGNOSIS — E11.9 TYPE 2 DIABETES MELLITUS WITHOUT COMPLICATION, WITHOUT LONG-TERM CURRENT USE OF INSULIN (HCC): ICD-10-CM

## 2017-09-07 LAB
ALBUMIN SERPL-MCNC: 3.5 G/DL (ref 3.5–4.8)
ALP LIVER SERPL-CCNC: 73 U/L (ref 55–142)
ALT SERPL-CCNC: 27 U/L (ref 14–54)
AST SERPL-CCNC: 16 U/L (ref 15–41)
BILIRUB SERPL-MCNC: 0.3 MG/DL (ref 0.1–2)
BUN BLD-MCNC: 14 MG/DL (ref 8–20)
CALCIUM BLD-MCNC: 8.7 MG/DL (ref 8.3–10.3)
CHLORIDE: 105 MMOL/L (ref 101–111)
CHOLEST SMN-MCNC: 157 MG/DL (ref ?–200)
CO2: 28 MMOL/L (ref 22–32)
CREAT BLD-MCNC: 0.79 MG/DL (ref 0.55–1.02)
EST. AVERAGE GLUCOSE BLD GHB EST-MCNC: 183 MG/DL (ref 68–126)
GLUCOSE BLD-MCNC: 112 MG/DL (ref 70–99)
HBA1C MFR BLD HPLC: 8 % (ref ?–5.7)
HDLC SERPL-MCNC: 59 MG/DL (ref 45–?)
HDLC SERPL: 2.66 {RATIO} (ref ?–4.44)
LDLC SERPL CALC-MCNC: 81 MG/DL (ref ?–130)
LDLC SERPL-MCNC: 17 MG/DL (ref 5–40)
M PROTEIN MFR SERPL ELPH: 7 G/DL (ref 6.1–8.3)
NONHDLC SERPL-MCNC: 98 MG/DL (ref ?–130)
POTASSIUM SERPL-SCNC: 4.2 MMOL/L (ref 3.6–5.1)
SODIUM SERPL-SCNC: 141 MMOL/L (ref 136–144)
TRIGLYCERIDES: 85 MG/DL (ref ?–150)

## 2017-09-07 PROCEDURE — 80053 COMPREHEN METABOLIC PANEL: CPT

## 2017-09-07 PROCEDURE — 36415 COLL VENOUS BLD VENIPUNCTURE: CPT

## 2017-09-07 PROCEDURE — 83036 HEMOGLOBIN GLYCOSYLATED A1C: CPT

## 2017-09-07 PROCEDURE — 80061 LIPID PANEL: CPT

## 2017-09-11 ENCOUNTER — PATIENT OUTREACH (OUTPATIENT)
Dept: CASE MANAGEMENT | Age: 67
End: 2017-09-11

## 2017-09-11 DIAGNOSIS — E78.5 HYPERLIPIDEMIA, UNSPECIFIED HYPERLIPIDEMIA TYPE: ICD-10-CM

## 2017-09-11 DIAGNOSIS — G25.0 ESSENTIAL TREMOR: ICD-10-CM

## 2017-09-11 DIAGNOSIS — E11.51 DIABETES MELLITUS WITH PERIPHERAL CIRCULATORY DISORDER (HCC): ICD-10-CM

## 2017-09-11 PROCEDURE — 99490 CHRNC CARE MGMT STAFF 1ST 20: CPT

## 2017-09-11 NOTE — PROGRESS NOTES
I called and spoke with pt to see how she is doing. Pt stated that everything is going really well. She just had her AWV with pcp. Pt also had her labs done and her A1C came down to an 8.0. She also lost 2 more lbs.  Pt stated that she was not happy with he

## 2017-09-14 RX ORDER — GLIPIZIDE 10 MG/1
TABLET, FILM COATED, EXTENDED RELEASE ORAL
Qty: 90 TABLET | Refills: 1 | Status: SHIPPED | OUTPATIENT
Start: 2017-09-14 | End: 2018-03-13

## 2017-09-14 NOTE — TELEPHONE ENCOUNTER
Medication failed protocol please approve or deny  LOV- 8/29/2017 physical  Last labs- 9/7/2017  Med last refilled- 4/10/2017

## 2017-09-15 ENCOUNTER — NURSE ONLY (OUTPATIENT)
Dept: FAMILY MEDICINE CLINIC | Facility: CLINIC | Age: 67
End: 2017-09-15

## 2017-09-15 DIAGNOSIS — E53.8 VITAMIN B12 DEFICIENCY: Primary | ICD-10-CM

## 2017-09-15 PROCEDURE — 96372 THER/PROPH/DIAG INJ SC/IM: CPT | Performed by: FAMILY MEDICINE

## 2017-09-15 RX ORDER — CYANOCOBALAMIN 1000 UG/ML
1000 INJECTION INTRAMUSCULAR; SUBCUTANEOUS ONCE
Status: COMPLETED | OUTPATIENT
Start: 2017-09-15 | End: 2017-09-15

## 2017-09-15 RX ADMIN — CYANOCOBALAMIN 1000 MCG: 1000 INJECTION INTRAMUSCULAR; SUBCUTANEOUS at 10:11:00

## 2017-10-09 RX ORDER — VALSARTAN AND HYDROCHLOROTHIAZIDE 160; 25 MG/1; MG/1
TABLET ORAL
Qty: 90 TABLET | Refills: 0 | Status: SHIPPED | OUTPATIENT
Start: 2017-10-09 | End: 2018-02-10

## 2017-10-11 ENCOUNTER — PATIENT OUTREACH (OUTPATIENT)
Dept: CASE MANAGEMENT | Age: 67
End: 2017-10-11

## 2017-10-11 DIAGNOSIS — E11.51 DIABETES MELLITUS WITH PERIPHERAL CIRCULATORY DISORDER (HCC): ICD-10-CM

## 2017-10-11 DIAGNOSIS — I10 ESSENTIAL HYPERTENSION: ICD-10-CM

## 2017-10-11 PROCEDURE — 99490 CHRNC CARE MGMT STAFF 1ST 20: CPT

## 2017-10-11 NOTE — PROGRESS NOTES
Coordination of education, review of chart, update to pt record, compilation and mailing resources/materials: Flu education, Why get the flu vaccine, and request to get flu vaccine with PCP and or [address] locations.   Time: 5 min    Total monthly time 5 minutes

## 2017-10-11 NOTE — PROGRESS NOTES
I called and spoke with pt to see how she is doing. Pt stated that everything is going well. She did go to an event last month on Sept 20th and had labs done, a bone scan, and her flu shot.  Pt stated that she is waiting for the results and will bring a

## 2017-10-18 ENCOUNTER — NURSE ONLY (OUTPATIENT)
Dept: FAMILY MEDICINE CLINIC | Facility: CLINIC | Age: 67
End: 2017-10-18

## 2017-10-18 DIAGNOSIS — E53.8 B12 DEFICIENCY: Primary | ICD-10-CM

## 2017-10-18 PROCEDURE — 96372 THER/PROPH/DIAG INJ SC/IM: CPT | Performed by: FAMILY MEDICINE

## 2017-10-18 RX ORDER — CYANOCOBALAMIN 1000 UG/ML
1000 INJECTION INTRAMUSCULAR; SUBCUTANEOUS ONCE
Status: COMPLETED | OUTPATIENT
Start: 2017-10-18 | End: 2017-10-18

## 2017-10-18 RX ADMIN — CYANOCOBALAMIN 1000 MCG: 1000 INJECTION INTRAMUSCULAR; SUBCUTANEOUS at 11:36:00

## 2017-10-24 ENCOUNTER — MED REC SCAN ONLY (OUTPATIENT)
Dept: FAMILY MEDICINE CLINIC | Facility: CLINIC | Age: 67
End: 2017-10-24

## 2017-11-01 NOTE — TELEPHONE ENCOUNTER
Medication(s) to Refill:   Pending Prescriptions Disp Refills    METFORMIN HCL 1000 MG Oral Tab [Pharmacy Med Name: METFORMIN HCL 1000 MG Tablet] 180 tablet 0     Sig: TAKE 1 TABLET TWICE DAILY WITH MEALS           Last Time Medication was Filled:  4/3/201

## 2017-11-02 NOTE — TELEPHONE ENCOUNTER
Medication(s) to Refill:   Pending Prescriptions Disp Refills    CLOPIDOGREL BISULFATE 75 MG Oral Tab [Pharmacy Med Name: CLOPIDOGREL 75 MG Tablet] 90 tablet 1     Sig: TAKE 1 TABLET EVERY DAY           Last Time Medication was Filled:  1/27/17    Last Off

## 2017-11-03 RX ORDER — CLOPIDOGREL BISULFATE 75 MG/1
TABLET ORAL
Qty: 90 TABLET | Refills: 3 | Status: SHIPPED | OUTPATIENT
Start: 2017-11-03 | End: 2018-11-02

## 2017-11-16 ENCOUNTER — NURSE ONLY (OUTPATIENT)
Dept: FAMILY MEDICINE CLINIC | Facility: CLINIC | Age: 67
End: 2017-11-16

## 2017-11-16 DIAGNOSIS — E53.8 B12 DEFICIENCY: Primary | ICD-10-CM

## 2017-11-16 PROCEDURE — 96372 THER/PROPH/DIAG INJ SC/IM: CPT | Performed by: FAMILY MEDICINE

## 2017-11-16 RX ORDER — CYANOCOBALAMIN 1000 UG/ML
1000 INJECTION INTRAMUSCULAR; SUBCUTANEOUS ONCE
Status: COMPLETED | OUTPATIENT
Start: 2017-11-16 | End: 2017-11-16

## 2017-11-16 RX ADMIN — CYANOCOBALAMIN 1000 MCG: 1000 INJECTION INTRAMUSCULAR; SUBCUTANEOUS at 09:10:00

## 2017-12-15 ENCOUNTER — NURSE ONLY (OUTPATIENT)
Dept: FAMILY MEDICINE CLINIC | Facility: CLINIC | Age: 67
End: 2017-12-15

## 2017-12-15 DIAGNOSIS — E53.8 B12 DEFICIENCY: Primary | ICD-10-CM

## 2017-12-15 PROCEDURE — 96372 THER/PROPH/DIAG INJ SC/IM: CPT | Performed by: FAMILY MEDICINE

## 2017-12-15 RX ORDER — CYANOCOBALAMIN 1000 UG/ML
1000 INJECTION INTRAMUSCULAR; SUBCUTANEOUS ONCE
Status: COMPLETED | OUTPATIENT
Start: 2017-12-15 | End: 2017-12-15

## 2017-12-15 RX ADMIN — CYANOCOBALAMIN 1000 MCG: 1000 INJECTION INTRAMUSCULAR; SUBCUTANEOUS at 11:20:00

## 2017-12-18 ENCOUNTER — PATIENT OUTREACH (OUTPATIENT)
Dept: CASE MANAGEMENT | Age: 67
End: 2017-12-18

## 2017-12-18 ENCOUNTER — TELEPHONE (OUTPATIENT)
Dept: CASE MANAGEMENT | Age: 67
End: 2017-12-18

## 2017-12-18 DIAGNOSIS — N39.46 MIXED STRESS AND URGE URINARY INCONTINENCE: Primary | ICD-10-CM

## 2017-12-18 DIAGNOSIS — E11.9 TYPE 2 DIABETES MELLITUS WITHOUT COMPLICATION, WITHOUT LONG-TERM CURRENT USE OF INSULIN (HCC): ICD-10-CM

## 2017-12-18 DIAGNOSIS — I10 ESSENTIAL HYPERTENSION: ICD-10-CM

## 2017-12-18 DIAGNOSIS — E78.5 HYPERLIPIDEMIA, UNSPECIFIED HYPERLIPIDEMIA TYPE: ICD-10-CM

## 2017-12-18 PROCEDURE — 99490 CHRNC CARE MGMT STAFF 1ST 20: CPT

## 2017-12-18 NOTE — TELEPHONE ENCOUNTER
Contacted patient for CCM follow up     She C/o Stress urgency, patient has been doing Kegel exercises for several months without success. Pt. would like to proceed to Urologist for possible surgery. Dr. Hi Herrmann please advise on referral requested.     Advise

## 2017-12-18 NOTE — PROGRESS NOTES
Spoke to Tiffany at length about CCM, HIPAA verified, current care plan and performed CCM assessment.  Reviewed pt     Active Problem List:     Essential tremor     Seizure ([facility] [address]     Lumbar spondylosis     Lumbar stenosis     Spondylolisthesis of lumbar harmony Kegel exercises for several months without success. Pt. would like to proceed to Urologist for possible surgery. Meds: Detailed medication review with Adria Fabry.  Discussed with Adria Fabry if any potential barriers are present that could prevent compliance wi

## 2017-12-18 NOTE — PROGRESS NOTES
Contacting patient to introduce my self as their new Chronic care manager.  [facility]      [phone]

## 2017-12-18 NOTE — TELEPHONE ENCOUNTER
Refer to urogyn  Dr. Claudene Ruiz or Annalisa Louise  (in network referral order did not have space for name of provider)

## 2017-12-19 NOTE — TELEPHONE ENCOUNTER
Referral placed in Epic for patient as requested. Called patient and spoke with her. Advised patient of this information. Patient provided Dr. Afsaneh Alcantar and Dr. Daniel Rollins office information. Patient states understanding.     Time spent: 4 minutes

## 2018-01-11 ENCOUNTER — PATIENT OUTREACH (OUTPATIENT)
Dept: CASE MANAGEMENT | Age: 68
End: 2018-01-11

## 2018-01-11 DIAGNOSIS — E78.5 HYPERLIPIDEMIA, UNSPECIFIED HYPERLIPIDEMIA TYPE: ICD-10-CM

## 2018-01-11 DIAGNOSIS — G25.0 ESSENTIAL TREMOR: ICD-10-CM

## 2018-01-11 DIAGNOSIS — E11.51 DIABETES MELLITUS WITH PERIPHERAL CIRCULATORY DISORDER (HCC): ICD-10-CM

## 2018-01-11 PROCEDURE — 99490 CHRNC CARE MGMT STAFF 1ST 20: CPT

## 2018-01-12 DIAGNOSIS — E03.9 HYPOTHYROIDISM, UNSPECIFIED TYPE: ICD-10-CM

## 2018-01-12 DIAGNOSIS — E11.51 DIABETES MELLITUS WITH PERIPHERAL CIRCULATORY DISORDER (HCC): Primary | ICD-10-CM

## 2018-01-15 PROBLEM — E03.9 HYPOTHYROIDISM: Status: ACTIVE | Noted: 2018-01-15

## 2018-01-15 RX ORDER — LEVOTHYROXINE SODIUM 0.07 MG/1
TABLET ORAL
Qty: 90 TABLET | Refills: 0 | Status: SHIPPED
Start: 2018-01-15 | End: 2018-01-23

## 2018-01-15 NOTE — TELEPHONE ENCOUNTER
Medication(s) to Refill:   Pending Prescriptions Disp Refills    LEVOTHYROXINE SODIUM 75 MCG Oral Tab [Pharmacy Med Name: LEVOTHYROXINE SODIUM 75 MCG Tablet] 90 tablet 0     Sig: TAKE 1 TABLET BEFORE BREAKFAST (LABS DUE)           Last Time Medication was Filled: 7/26/2017    Last thyroid level: 5/5/2017    Last Office Visit with PCP: 8/29/2017    When Patient was Due Back to the Office:  (from when PCP last addressed medication)  [] 1 month,  [x] 3 months,  [] 6 months,  [] 12 months,  [] Other      Future Appointments  Date Time Provider Ingris Martinez   1/17/2018 3:00 PM Fozia Minor [facility]        Please approve or deny, pending order for labs

## 2018-01-16 NOTE — PROGRESS NOTES
Spoke to Tiffany at length about CCM, HIPAA verified, current care plan and performed CCM assessment.  Reviewed pt Patient Active Problem List:     Essential tremor     Seizure [facility])     Lumbar spondylosis     Lumbar stenosis     Spondylolisthesis of lumbar too far for her and a male. SHe prefers to only females. Patient will be scheduling appt with PCP for a follow up she will discuss referral at that time. Meds: Detailed medication review with Ulysses Malay.  Discussed with Ulysses Malay if any potential barriers are

## 2018-01-17 PROCEDURE — 88305 TISSUE EXAM BY PATHOLOGIST: CPT | Performed by: INTERNAL MEDICINE

## 2018-01-18 ENCOUNTER — NURSE ONLY (OUTPATIENT)
Dept: FAMILY MEDICINE CLINIC | Facility: CLINIC | Age: 68
End: 2018-01-18

## 2018-01-18 ENCOUNTER — APPOINTMENT (OUTPATIENT)
Dept: LAB | Age: 68
End: 2018-01-18
Attending: FAMILY MEDICINE
Payer: MEDICARE

## 2018-01-18 DIAGNOSIS — E11.51 DIABETES MELLITUS WITH PERIPHERAL CIRCULATORY DISORDER (HCC): ICD-10-CM

## 2018-01-18 DIAGNOSIS — E03.9 HYPOTHYROIDISM, UNSPECIFIED TYPE: ICD-10-CM

## 2018-01-18 DIAGNOSIS — E53.8 B12 DEFICIENCY: Primary | ICD-10-CM

## 2018-01-18 LAB
ALBUMIN SERPL-MCNC: 3.7 G/DL (ref 3.5–4.8)
ALP LIVER SERPL-CCNC: 70 U/L (ref 55–142)
ALT SERPL-CCNC: 36 U/L (ref 14–54)
AST SERPL-CCNC: 28 U/L (ref 15–41)
BILIRUB SERPL-MCNC: 0.5 MG/DL (ref 0.1–2)
BUN BLD-MCNC: 13 MG/DL (ref 8–20)
CALCIUM BLD-MCNC: 8.7 MG/DL (ref 8.3–10.3)
CHLORIDE: 105 MMOL/L (ref 101–111)
CHOLEST SMN-MCNC: 163 MG/DL (ref ?–200)
CO2: 26 MMOL/L (ref 22–32)
CREAT BLD-MCNC: 0.9 MG/DL (ref 0.55–1.02)
CREAT UR-SCNC: 215 MG/DL
EST. AVERAGE GLUCOSE BLD GHB EST-MCNC: 192 MG/DL (ref 68–126)
FREE T4: 1.1 NG/DL (ref 0.9–1.8)
GLUCOSE BLD-MCNC: 134 MG/DL (ref 70–99)
HBA1C MFR BLD HPLC: 8.3 % (ref ?–5.7)
HDLC SERPL-MCNC: 63 MG/DL (ref 45–?)
HDLC SERPL: 2.59 {RATIO} (ref ?–4.44)
LDLC SERPL CALC-MCNC: 81 MG/DL (ref ?–130)
M PROTEIN MFR SERPL ELPH: 7.3 G/DL (ref 6.1–8.3)
MICROALBUMIN UR-MCNC: 1.15 MG/DL
MICROALBUMIN/CREAT 24H UR-RTO: 5.3 UG/MG (ref ?–30)
NONHDLC SERPL-MCNC: 100 MG/DL (ref ?–130)
POTASSIUM SERPL-SCNC: 3.9 MMOL/L (ref 3.6–5.1)
SODIUM SERPL-SCNC: 140 MMOL/L (ref 136–144)
TRIGL SERPL-MCNC: 97 MG/DL (ref ?–150)
TSI SER-ACNC: 4.22 MIU/ML (ref 0.35–5.5)
VLDLC SERPL CALC-MCNC: 19 MG/DL (ref 5–40)

## 2018-01-18 PROCEDURE — 83036 HEMOGLOBIN GLYCOSYLATED A1C: CPT

## 2018-01-18 PROCEDURE — 80053 COMPREHEN METABOLIC PANEL: CPT

## 2018-01-18 PROCEDURE — 84439 ASSAY OF FREE THYROXINE: CPT

## 2018-01-18 PROCEDURE — 84443 ASSAY THYROID STIM HORMONE: CPT

## 2018-01-18 PROCEDURE — 96372 THER/PROPH/DIAG INJ SC/IM: CPT | Performed by: FAMILY MEDICINE

## 2018-01-18 PROCEDURE — 80061 LIPID PANEL: CPT

## 2018-01-18 PROCEDURE — 82043 UR ALBUMIN QUANTITATIVE: CPT

## 2018-01-18 PROCEDURE — 82570 ASSAY OF URINE CREATININE: CPT

## 2018-01-18 PROCEDURE — 36415 COLL VENOUS BLD VENIPUNCTURE: CPT

## 2018-01-18 RX ORDER — CYANOCOBALAMIN 1000 UG/ML
1000 INJECTION INTRAMUSCULAR; SUBCUTANEOUS ONCE
Status: COMPLETED | OUTPATIENT
Start: 2018-01-18 | End: 2018-01-18

## 2018-01-18 RX ADMIN — CYANOCOBALAMIN 1000 MCG: 1000 INJECTION INTRAMUSCULAR; SUBCUTANEOUS at 10:07:00

## 2018-01-22 RX ORDER — SIMVASTATIN 10 MG
TABLET ORAL
Qty: 90 TABLET | Refills: 0 | Status: SHIPPED | OUTPATIENT
Start: 2018-01-22 | End: 2018-05-03

## 2018-01-22 NOTE — TELEPHONE ENCOUNTER
Medication(s) to Refill:   Pending Prescriptions Disp Refills    SIMVASTATIN 10 MG Oral Tab [Pharmacy Med Name: SIMVASTATIN 10 MG Tablet] 90 tablet 1     Sig: TAKE 1 TABLET EVERY NIGHT           Last Time Medication was Filled:  8/27/2017 x 6 months    Las

## 2018-01-23 ENCOUNTER — TELEPHONE (OUTPATIENT)
Dept: FAMILY MEDICINE CLINIC | Facility: CLINIC | Age: 68
End: 2018-01-23

## 2018-01-23 ENCOUNTER — OFFICE VISIT (OUTPATIENT)
Dept: FAMILY MEDICINE CLINIC | Facility: CLINIC | Age: 68
End: 2018-01-23

## 2018-01-23 VITALS
OXYGEN SATURATION: 98 % | HEIGHT: 64.5 IN | SYSTOLIC BLOOD PRESSURE: 120 MMHG | HEART RATE: 64 BPM | WEIGHT: 223 LBS | BODY MASS INDEX: 37.61 KG/M2 | DIASTOLIC BLOOD PRESSURE: 80 MMHG | RESPIRATION RATE: 16 BRPM

## 2018-01-23 DIAGNOSIS — N39.41 URGE INCONTINENCE: ICD-10-CM

## 2018-01-23 DIAGNOSIS — E11.51 DIABETES MELLITUS WITH PERIPHERAL CIRCULATORY DISORDER (HCC): Primary | ICD-10-CM

## 2018-01-23 DIAGNOSIS — E03.9 HYPOTHYROIDISM, UNSPECIFIED TYPE: ICD-10-CM

## 2018-01-23 DIAGNOSIS — Z78.0 POSTMENOPAUSAL: ICD-10-CM

## 2018-01-23 PROCEDURE — 99214 OFFICE O/P EST MOD 30 MIN: CPT | Performed by: FAMILY MEDICINE

## 2018-01-23 RX ORDER — LEVOTHYROXINE SODIUM 0.07 MG/1
TABLET ORAL
Qty: 90 TABLET | Refills: 0 | Status: SHIPPED | OUTPATIENT
Start: 2018-01-23 | End: 2018-06-09

## 2018-01-23 NOTE — TELEPHONE ENCOUNTER
Pharmacy called states they received rx Blood Glucose Monitoring Suppl (ZHOU CONTOUR NEXT MONITOR) w/Device Does not apply Kit and Glucose Blood (ZHOU CONTOUR NEXT TEST) In Vitro Strip but the scripts are still incorrect.  Since pt is medicare and they re

## 2018-01-23 NOTE — TELEPHONE ENCOUNTER
LEVOTHYROXINE SODIUM 75 MCG Oral Tab     Patient called stating Francine said is telling her they did not receive the rx refill sent on 1/15/18 and would like it sent over again.

## 2018-01-23 NOTE — PATIENT INSTRUCTIONS
For your thyroid  Take 1/2 tab of 75mcg and 1 tab of 50mcg  When the 50mcg tablets run out then take 1.5 tab of 75mcg  Repeat labs in 3 months

## 2018-01-23 NOTE — PROGRESS NOTES
Patient presents with:  Diabetes      Jerardo Camejo is a 75year old year old who presents for recheck of diabetes. Pt has been checking feet on a regular basis. Concerned about ingrown toenail. Pt denies any tingling of the feet.  Pt denies any sx's of MG Oral Tab, TAKE 1 TABLET EVERY NIGHT, Disp: 90 tablet, Rfl: 0  •  LEVOTHYROXINE SODIUM 75 MCG Oral Tab, TAKE 1 TABLET BEFORE BREAKFAST (LABS DUE), Disp: 90 tablet, Rfl: 0  •  CLOPIDOGREL BISULFATE 75 MG Oral Tab, TAKE 1 TABLET EVERY DAY, Disp: 90 tablet, 02/18/2014 16   10/07/2013 22   08/08/2013 22   ----------  Alt (U/L)   Date Value   01/18/2018 36   09/07/2017 27   01/27/2017 27   ----------     reports that she has never smoked.  She has never used smokeless tobacco.    Counseling given: Not Answered Onychomycosis of toenail     Pain in toes of both feet     Tyloma     Diabetes mellitus with peripheral circulatory disorder (HCC)     Hypothyroidism        Current Outpatient Prescriptions:  Blood Glucose Monitoring Suppl (MtoV CONTOUR NEXT MONITOR) w/De disorder ([facility]    • Tremor    • Type II or unspecified type diabetes mellitus without mention of complication, not stated as uncontrolled    • Unspecified disorder of thyroid    • Unspecified essential hypertension    • Varicose veins      Past Surgical Hi Surgeon: Callum Pruitt MD;  Location: [facility] date: TONSILLECTOMY  5/25/2016: UPPER GI ENDOSCOPY,BIOPSY N/A      Comment: Procedure: ESOPHAGOGASTRODUODENOSCOPY,                POSSIBLE BIOPSY, POSSIBLE POLYPECTOMY 18933; normal as well. A/P:    Diabetes mellitus with peripheral circulatory disorder (hcc)  (primary encounter diagnosis)  A1c increased, patient does not want to start any additional medications at this time.   We will refer to diabetic dietitian to assist wi months

## 2018-01-25 ENCOUNTER — MED REC SCAN ONLY (OUTPATIENT)
Dept: FAMILY MEDICINE CLINIC | Facility: CLINIC | Age: 68
End: 2018-01-25

## 2018-01-31 PROBLEM — L85.3 XEROSIS OF SKIN: Status: ACTIVE | Noted: 2018-01-31

## 2018-01-31 PROBLEM — L60.0 ONYCHOCRYPTOSIS: Status: ACTIVE | Noted: 2018-01-31

## 2018-01-31 PROBLEM — M79.671 PAIN IN RIGHT FOOT: Status: ACTIVE | Noted: 2018-01-31

## 2018-02-01 ENCOUNTER — DIABETIC EDUCATION (OUTPATIENT)
Dept: ENDOCRINOLOGY CLINIC | Facility: CLINIC | Age: 68
End: 2018-02-01

## 2018-02-01 DIAGNOSIS — E11.51 DIABETES MELLITUS WITH PERIPHERAL CIRCULATORY DISORDER (HCC): Primary | ICD-10-CM

## 2018-02-01 PROCEDURE — 97802 MEDICAL NUTRITION INDIV IN: CPT | Performed by: DIETITIAN, REGISTERED

## 2018-02-01 NOTE — PROGRESS NOTES
Jeaneth Perea  [id number] was seen for Diabetic Medical Nutrition Therapy:    Date: 2/1/2018  Start time: 11:00 End time: 12:00    Assessment: Wt (P) 223 lb   BMI (P) 37.69 kg/m²     HGBA1C (%)   Date Value   06/10/2014 6.7 (H)   ----------  HgbA1C (%)

## 2018-02-08 ENCOUNTER — PATIENT OUTREACH (OUTPATIENT)
Dept: CASE MANAGEMENT | Age: 68
End: 2018-02-08

## 2018-02-08 DIAGNOSIS — E11.51 DIABETES MELLITUS WITH PERIPHERAL CIRCULATORY DISORDER (HCC): ICD-10-CM

## 2018-02-08 DIAGNOSIS — E78.5 HYPERLIPIDEMIA, UNSPECIFIED HYPERLIPIDEMIA TYPE: ICD-10-CM

## 2018-02-08 PROCEDURE — 99490 CHRNC CARE MGMT STAFF 1ST 20: CPT

## 2018-02-08 NOTE — PROGRESS NOTES
2/8/2018  Spoke to Tiffany for CCM.       Updates to patient care team/ comments: Updated   Patient reported changes in medications: None  Med Adherence  Comment: N/a     Patient Active Problem List:     Essential tremor     Seizure ([facility] [address]     Lumbar spondylo Colonoscopy completed polyp removed. Recal for 3 years. Dexa Scan- order pending, planning on having it done in the next month. After her toe is fully recovered. Previous goal met:No     Self Management Goals/Action Plan:    ?  Active goal from previo Physical assessment, complete health history, and care plan established by Leida Parker MD, need for CCM determined from these assessments and data.

## 2018-02-08 NOTE — PROGRESS NOTES
Calling to follow up on CCM [facility]    Time Spent This Encounter Total: 1 min medical record review, telephone communication, care plan updates where needed, and education. Provided acknowledgment and validation to patient's concerns.      Monthly Minute Total

## 2018-02-10 RX ORDER — POTASSIUM CHLORIDE 750 MG/1
TABLET, EXTENDED RELEASE ORAL
Qty: 180 TABLET | Refills: 1 | Status: SHIPPED | OUTPATIENT
Start: 2018-02-10 | End: 2018-08-23

## 2018-02-10 NOTE — TELEPHONE ENCOUNTER
Medication(s) to Refill:   Pending Prescriptions Disp Refills    POTASSIUM CHLORIDE ER 10 MEQ Oral Tab CR [Pharmacy Med Name: POTASSIUM CHLORIDE ER 10 MEQ Tablet Extended Release] 180 tablet 1     Sig: TAKE 2 TABLETS EVERY DAY           Last Time Medicatio

## 2018-02-12 RX ORDER — VALSARTAN AND HYDROCHLOROTHIAZIDE 160; 25 MG/1; MG/1
TABLET ORAL
Qty: 90 TABLET | Refills: 0 | Status: SHIPPED | OUTPATIENT
Start: 2018-02-12 | End: 2018-05-18

## 2018-02-12 NOTE — TELEPHONE ENCOUNTER
Medication(s) to Refill:   Pending Prescriptions Disp Refills    VALSARTAN-HYDROCHLOROTHIAZIDE 160-25 MG Oral Tab [Pharmacy Med Name: VALSARTAN/HYDROCHLOROTHIAZIDE 160-25 MG Tablet] 90 tablet 0     Sig: TAKE 1 TABLET EVERY DAY           Last Time Medicatio

## 2018-02-16 ENCOUNTER — NURSE ONLY (OUTPATIENT)
Dept: FAMILY MEDICINE CLINIC | Facility: CLINIC | Age: 68
End: 2018-02-16

## 2018-02-16 DIAGNOSIS — E53.8 B12 DEFICIENCY: Primary | ICD-10-CM

## 2018-02-16 PROCEDURE — 96372 THER/PROPH/DIAG INJ SC/IM: CPT | Performed by: FAMILY MEDICINE

## 2018-02-16 RX ORDER — CYANOCOBALAMIN 1000 UG/ML
1000 INJECTION INTRAMUSCULAR; SUBCUTANEOUS ONCE
Status: COMPLETED | OUTPATIENT
Start: 2018-02-16 | End: 2018-02-16

## 2018-02-16 RX ADMIN — CYANOCOBALAMIN 1000 MCG: 1000 INJECTION INTRAMUSCULAR; SUBCUTANEOUS at 10:03:00

## 2018-02-19 ENCOUNTER — HOSPITAL ENCOUNTER (OUTPATIENT)
Dept: BONE DENSITY | Age: 68
Discharge: HOME | End: 2018-02-19
Attending: FAMILY MEDICINE
Payer: MEDICARE

## 2018-02-19 DIAGNOSIS — Z78.0 POSTMENOPAUSAL: ICD-10-CM

## 2018-02-19 PROCEDURE — 77080 DXA BONE DENSITY AXIAL: CPT | Performed by: FAMILY MEDICINE

## 2018-03-05 ENCOUNTER — PATIENT OUTREACH (OUTPATIENT)
Dept: CASE MANAGEMENT | Age: 68
End: 2018-03-05

## 2018-03-05 DIAGNOSIS — E11.51 DIABETES MELLITUS WITH PERIPHERAL CIRCULATORY DISORDER (HCC): ICD-10-CM

## 2018-03-05 DIAGNOSIS — E78.5 HYPERLIPIDEMIA, UNSPECIFIED HYPERLIPIDEMIA TYPE: ICD-10-CM

## 2018-03-05 DIAGNOSIS — I10 ESSENTIAL HYPERTENSION: ICD-10-CM

## 2018-03-05 PROCEDURE — 99490 CHRNC CARE MGMT STAFF 1ST 20: CPT

## 2018-03-05 NOTE — PROGRESS NOTES
[facility]  Time Spent This Encounter Total:1 min medical record review, telephone communication, care plan updates where needed, and education. Provided acknowledgment and validation to patient's concerns.      Monthly Minute Total including today: 1

## 2018-03-06 NOTE — PROGRESS NOTES
Spoke to Lambert Allen verified for CCM call.     Medical History  Patient Active Problem List:     Essential tremor     Seizure [facility])     Lumbar spondylosis     Lumbar stenosis     Spondylolisthesis of lumbar region     Lumbar radiculitis     UTI (lower ur and canned beans.     Previous goal met: Progressing    Self Management Goals/Action Plan:     Goal from previous outreach:My goal for next month is: Low carb diet aiming for weight loss and diabetic control                     • Patient reported progress t

## 2018-03-13 NOTE — TELEPHONE ENCOUNTER
Medication(s) to Refill:   Pending Prescriptions Disp Refills    GLIPIZIDE ER 10 MG Oral Tablet 24 Hr [Pharmacy Med Name: GLIPIZIDE ER 10 MG Tablet Extended Release 24 Hour] 90 tablet 1     Sig: TAKE 1 TABLET EVERY DAY             Reason for Medication Ref

## 2018-03-14 RX ORDER — GLIPIZIDE 10 MG/1
TABLET, FILM COATED, EXTENDED RELEASE ORAL
Qty: 90 TABLET | Refills: 1 | Status: SHIPPED | OUTPATIENT
Start: 2018-03-14 | End: 2018-09-21

## 2018-03-15 ENCOUNTER — NURSE ONLY (OUTPATIENT)
Dept: FAMILY MEDICINE CLINIC | Facility: CLINIC | Age: 68
End: 2018-03-15

## 2018-03-15 DIAGNOSIS — E53.8 B12 DEFICIENCY: Primary | ICD-10-CM

## 2018-03-15 PROCEDURE — 96372 THER/PROPH/DIAG INJ SC/IM: CPT | Performed by: FAMILY MEDICINE

## 2018-03-15 RX ORDER — CYANOCOBALAMIN 1000 UG/ML
1000 INJECTION INTRAMUSCULAR; SUBCUTANEOUS ONCE
Status: COMPLETED | OUTPATIENT
Start: 2018-03-15 | End: 2018-03-15

## 2018-03-15 RX ADMIN — CYANOCOBALAMIN 1000 MCG: 1000 INJECTION INTRAMUSCULAR; SUBCUTANEOUS at 09:04:00

## 2018-03-21 ENCOUNTER — PATIENT OUTREACH (OUTPATIENT)
Dept: FAMILY MEDICINE CLINIC | Facility: CLINIC | Age: 68
End: 2018-03-21

## 2018-04-12 ENCOUNTER — PATIENT OUTREACH (OUTPATIENT)
Dept: CASE MANAGEMENT | Age: 68
End: 2018-04-12

## 2018-04-12 DIAGNOSIS — I10 ESSENTIAL HYPERTENSION: ICD-10-CM

## 2018-04-12 DIAGNOSIS — E78.5 HYPERLIPIDEMIA, UNSPECIFIED HYPERLIPIDEMIA TYPE: ICD-10-CM

## 2018-04-12 DIAGNOSIS — E11.9 TYPE 2 DIABETES MELLITUS WITHOUT COMPLICATION, WITHOUT LONG-TERM CURRENT USE OF INSULIN (HCC): ICD-10-CM

## 2018-04-12 PROCEDURE — 99490 CHRNC CARE MGMT STAFF 1ST 20: CPT

## 2018-04-17 ENCOUNTER — NURSE ONLY (OUTPATIENT)
Dept: FAMILY MEDICINE CLINIC | Facility: CLINIC | Age: 68
End: 2018-04-17

## 2018-04-17 DIAGNOSIS — E53.8 B12 DEFICIENCY: Primary | ICD-10-CM

## 2018-04-17 PROCEDURE — 96372 THER/PROPH/DIAG INJ SC/IM: CPT | Performed by: FAMILY MEDICINE

## 2018-04-17 RX ORDER — CYANOCOBALAMIN 1000 UG/ML
1000 INJECTION INTRAMUSCULAR; SUBCUTANEOUS ONCE
Status: COMPLETED | OUTPATIENT
Start: 2018-04-17 | End: 2018-04-17

## 2018-04-17 RX ADMIN — CYANOCOBALAMIN 1000 MCG: 1000 INJECTION INTRAMUSCULAR; SUBCUTANEOUS at 10:15:00

## 2018-04-26 NOTE — PROGRESS NOTES
[facility]    Time Spent This Encounter Total: 1 min medical record review, telephone communication, care plan updates where needed, and education. Provided acknowledgment and validation to patient's concerns.      Monthly Minute Total including today: 3

## 2018-04-27 NOTE — PROGRESS NOTES
Spoke to Lambert Allen verified for CCM call.     Medical History  Patient Active Problem List:     Essential tremor     Seizure [facility])     Lumbar spondylosis     Lumbar stenosis     Spondylolisthesis of lumbar region     Lumbar radiculitis     UTI (lower ur met:  (No ) Progressing    Self Management Goals/Action Plan:    • Goal from previous outreach: Low carb diet aiming for weight loss and diabetic control                        • Patient reported progress toward goal:None      • Update to previous barriers

## 2018-05-04 ENCOUNTER — PATIENT OUTREACH (OUTPATIENT)
Dept: CASE MANAGEMENT | Age: 68
End: 2018-05-04

## 2018-05-04 DIAGNOSIS — E78.5 HYPERLIPIDEMIA, UNSPECIFIED HYPERLIPIDEMIA TYPE: ICD-10-CM

## 2018-05-04 DIAGNOSIS — E11.51 DIABETES MELLITUS WITH PERIPHERAL CIRCULATORY DISORDER (HCC): ICD-10-CM

## 2018-05-04 DIAGNOSIS — I10 ESSENTIAL HYPERTENSION: ICD-10-CM

## 2018-05-04 DIAGNOSIS — E03.9 HYPOTHYROIDISM, UNSPECIFIED TYPE: ICD-10-CM

## 2018-05-04 PROCEDURE — 99490 CHRNC CARE MGMT STAFF 1ST 20: CPT

## 2018-05-04 NOTE — PROGRESS NOTES
Spoke to Lambert Allen verified for CCM call.     Medical History  Patient Active Problem List:     Essential tremor     Seizure [facility])     Lumbar spondylosis     Lumbar stenosis     Spondylolisthesis of lumbar region     Lumbar radiculitis     UTI (lower ur he works a lot. She has two sons that live a bit far but one of her sons is trying to come every Tuesday and Thursday for Tea. Her other son will come one a month and see her. She tries to keep busy by knitting and talking to neighbors.   She's excited her history, and need for CCM established by Adina Edwards MD.

## 2018-05-04 NOTE — PROGRESS NOTES
LMCB    Time Spent This Encounter Total: 1 min medical record review, telephone communication, care plan updates where needed, and education. Provided acknowledgment and validation to patient's concerns.      Monthly Minute Total including today: 1

## 2018-05-09 RX ORDER — SIMVASTATIN 10 MG
TABLET ORAL
Qty: 90 TABLET | Refills: 1 | Status: SHIPPED | OUTPATIENT
Start: 2018-05-09 | End: 2018-09-06

## 2018-05-09 RX ORDER — OMEPRAZOLE 20 MG/1
CAPSULE, DELAYED RELEASE ORAL
Qty: 180 CAPSULE | Refills: 1 | Status: SHIPPED | OUTPATIENT
Start: 2018-05-09 | End: 2018-11-16

## 2018-05-15 ENCOUNTER — NURSE ONLY (OUTPATIENT)
Dept: FAMILY MEDICINE CLINIC | Facility: CLINIC | Age: 68
End: 2018-05-15

## 2018-05-15 DIAGNOSIS — E53.8 B12 DEFICIENCY: Primary | ICD-10-CM

## 2018-05-15 PROCEDURE — 96372 THER/PROPH/DIAG INJ SC/IM: CPT | Performed by: FAMILY MEDICINE

## 2018-05-15 RX ORDER — CYANOCOBALAMIN 1000 UG/ML
1000 INJECTION INTRAMUSCULAR; SUBCUTANEOUS ONCE
Status: COMPLETED | OUTPATIENT
Start: 2018-05-15 | End: 2018-05-15

## 2018-05-15 RX ADMIN — CYANOCOBALAMIN 1000 MCG: 1000 INJECTION INTRAMUSCULAR; SUBCUTANEOUS at 10:19:00

## 2018-05-18 ENCOUNTER — PATIENT OUTREACH (OUTPATIENT)
Dept: FAMILY MEDICINE CLINIC | Facility: CLINIC | Age: 68
End: 2018-05-18

## 2018-05-18 DIAGNOSIS — E11.9 TYPE 2 DIABETES MELLITUS WITHOUT COMPLICATION, WITHOUT LONG-TERM CURRENT USE OF INSULIN (HCC): Primary | ICD-10-CM

## 2018-05-18 RX ORDER — VALSARTAN AND HYDROCHLOROTHIAZIDE 160; 25 MG/1; MG/1
TABLET ORAL
Qty: 90 TABLET | Refills: 0 | Status: SHIPPED | OUTPATIENT
Start: 2018-05-18 | End: 2018-08-31

## 2018-05-18 NOTE — PROGRESS NOTES
Spoke with patient about diabetic follow up and labs, transferred patient to phone room for an appointment but refused to schedule at this time. I will place orders for lab work.

## 2018-05-18 NOTE — TELEPHONE ENCOUNTER
Rx filled per protocol. Pt due for OV f/u. .. LOV: 1/2018 follow up was requested for 3 months. Please call to schedule.    Thank you       (routed to front office for scheduling)

## 2018-05-24 ENCOUNTER — APPOINTMENT (OUTPATIENT)
Dept: LAB | Age: 68
End: 2018-05-24
Attending: FAMILY MEDICINE
Payer: MEDICARE

## 2018-05-24 DIAGNOSIS — E11.51 DIABETES MELLITUS WITH PERIPHERAL CIRCULATORY DISORDER (HCC): ICD-10-CM

## 2018-05-24 DIAGNOSIS — E11.9 TYPE 2 DIABETES MELLITUS WITHOUT COMPLICATION, WITHOUT LONG-TERM CURRENT USE OF INSULIN (HCC): ICD-10-CM

## 2018-05-24 DIAGNOSIS — E03.9 HYPOTHYROIDISM, UNSPECIFIED TYPE: ICD-10-CM

## 2018-05-24 PROCEDURE — 84443 ASSAY THYROID STIM HORMONE: CPT

## 2018-05-24 PROCEDURE — 83036 HEMOGLOBIN GLYCOSYLATED A1C: CPT

## 2018-05-24 PROCEDURE — 80061 LIPID PANEL: CPT

## 2018-05-24 PROCEDURE — 82043 UR ALBUMIN QUANTITATIVE: CPT

## 2018-05-24 PROCEDURE — 36415 COLL VENOUS BLD VENIPUNCTURE: CPT

## 2018-05-24 PROCEDURE — 82570 ASSAY OF URINE CREATININE: CPT

## 2018-05-24 PROCEDURE — 84439 ASSAY OF FREE THYROXINE: CPT

## 2018-05-31 ENCOUNTER — OFFICE VISIT (OUTPATIENT)
Dept: FAMILY MEDICINE CLINIC | Facility: CLINIC | Age: 68
End: 2018-05-31

## 2018-05-31 VITALS
HEIGHT: 64.5 IN | WEIGHT: 222 LBS | SYSTOLIC BLOOD PRESSURE: 116 MMHG | DIASTOLIC BLOOD PRESSURE: 70 MMHG | TEMPERATURE: 98 F | RESPIRATION RATE: 16 BRPM | HEART RATE: 73 BPM | OXYGEN SATURATION: 97 % | BODY MASS INDEX: 37.44 KG/M2

## 2018-05-31 DIAGNOSIS — E11.9 TYPE 2 DIABETES MELLITUS WITHOUT COMPLICATION, WITHOUT LONG-TERM CURRENT USE OF INSULIN (HCC): Primary | ICD-10-CM

## 2018-05-31 DIAGNOSIS — E03.9 HYPOTHYROIDISM, UNSPECIFIED TYPE: ICD-10-CM

## 2018-05-31 DIAGNOSIS — E78.5 HYPERLIPIDEMIA, UNSPECIFIED HYPERLIPIDEMIA TYPE: ICD-10-CM

## 2018-05-31 DIAGNOSIS — I10 ESSENTIAL HYPERTENSION: ICD-10-CM

## 2018-05-31 PROCEDURE — 99214 OFFICE O/P EST MOD 30 MIN: CPT | Performed by: PHYSICIAN ASSISTANT

## 2018-05-31 RX ORDER — LEVOTHYROXINE SODIUM 88 UG/1
88 TABLET ORAL
Qty: 90 TABLET | Refills: 0 | Status: SHIPPED | OUTPATIENT
Start: 2018-05-31 | End: 2018-08-29

## 2018-05-31 NOTE — PROGRESS NOTES
HPI:   Newman Castleman is a 70year old female who presents for recheck of her diabetes. Taking glipizide and metformin. A1C at 7.9. She is trying to follow low carb diet-avoiding bread. Patient’s fasting blood sugars have been 174.    Last visit with opht 16   10/07/2013 22   08/08/2013 22   ----------  Alt (U/L)   Date Value   01/18/2018 36   09/07/2017 27   01/27/2017 27   ----------     Malb/Cre Calc   Date Value Ref Range Status   05/24/2018 6.5 <=30.0 ug/mg Final   01/18/2018 5.3 <=30.0 ug/mg Final   0 PRESSURE    • HIGH CHOLESTEROL    • Hypothyroid    • Other and unspecified hyperlipidemia    • Seizure disorder (HCC)    • Tremor    • Type II or unspecified type diabetes mellitus without mention of complication, not stated as uncontrolled    • Unspecifie N/A      Comment: Procedure: COLONOSCOPY, POSSIBLE BIOPSY,                POSSIBLE POLYPECTOMY 63779;  Surgeon: Junior Palma MD;  Location: [facility] date: TONSILLECTOMY  5/25/2016: UPPER GI ENDOSCOPY,BIOPSY N/A      Comment: Procedu COMP METABOLIC PANEL (14); Future  -     HEMOGLOBIN A1C; Future  -     Empagliflozin (JARDIANCE) 10 MG Oral Tab; Take 10 mg by mouth daily.  - Add Jardiance. Side effects including UTI/yeast infections discussed. Monitor blood sugar.  Call if low values

## 2018-06-11 ENCOUNTER — PATIENT OUTREACH (OUTPATIENT)
Dept: CASE MANAGEMENT | Age: 68
End: 2018-06-11

## 2018-06-11 DIAGNOSIS — E03.9 HYPOTHYROIDISM, UNSPECIFIED TYPE: ICD-10-CM

## 2018-06-11 DIAGNOSIS — I10 ESSENTIAL HYPERTENSION: ICD-10-CM

## 2018-06-11 DIAGNOSIS — E11.9 TYPE 2 DIABETES MELLITUS WITHOUT COMPLICATION, WITHOUT LONG-TERM CURRENT USE OF INSULIN (HCC): ICD-10-CM

## 2018-06-11 RX ORDER — LEVOTHYROXINE SODIUM 0.07 MG/1
TABLET ORAL
Qty: 90 TABLET | Refills: 0 | Status: SHIPPED | OUTPATIENT
Start: 2018-06-11 | End: 2018-09-06

## 2018-06-11 NOTE — PROGRESS NOTES
Spoke to Lambert Allen verified for CCM call.     Medical History  Patient Active Problem List:     Essential tremor     Seizure [facility])     Lumbar spondylosis     Lumbar stenosis     Spondylolisthesis of lumbar region     Lumbar radiculitis     UTI (lower ur daughter was in town from \[facility] [place] [facility]\"", patient states she has been very busy running around with daughter.     Previous goal met: Progressing    Self Management Goals/Action Plan:    • Goal from previous outreach: Low carb diet, exercise                        •

## 2018-06-14 ENCOUNTER — OFFICE VISIT (OUTPATIENT)
Dept: FAMILY MEDICINE CLINIC | Facility: CLINIC | Age: 68
End: 2018-06-14

## 2018-06-14 VITALS
OXYGEN SATURATION: 98 % | BODY MASS INDEX: 37.27 KG/M2 | DIASTOLIC BLOOD PRESSURE: 50 MMHG | SYSTOLIC BLOOD PRESSURE: 118 MMHG | HEIGHT: 64.5 IN | HEART RATE: 80 BPM | TEMPERATURE: 99 F | RESPIRATION RATE: 16 BRPM | WEIGHT: 221 LBS

## 2018-06-14 DIAGNOSIS — E53.8 B12 DEFICIENCY: ICD-10-CM

## 2018-06-14 DIAGNOSIS — J06.9 URI WITH COUGH AND CONGESTION: Primary | ICD-10-CM

## 2018-06-14 PROCEDURE — 99213 OFFICE O/P EST LOW 20 MIN: CPT | Performed by: NURSE PRACTITIONER

## 2018-06-14 PROCEDURE — 96372 THER/PROPH/DIAG INJ SC/IM: CPT | Performed by: NURSE PRACTITIONER

## 2018-06-14 RX ORDER — CYANOCOBALAMIN 1000 UG/ML
1000 INJECTION INTRAMUSCULAR; SUBCUTANEOUS ONCE
Status: COMPLETED | OUTPATIENT
Start: 2018-06-14 | End: 2018-06-14

## 2018-06-14 RX ADMIN — CYANOCOBALAMIN 1000 MCG: 1000 INJECTION INTRAMUSCULAR; SUBCUTANEOUS at 12:28:00

## 2018-06-14 NOTE — PROGRESS NOTES
CHIEF COMPLAINT:   Patient presents with:  Cough: cold sxs x 3 days, today feels chest pain with coughing and sneezing       HPI:   Lissette Area is a 70year old female who presents for upper respiratory symptoms for  3 days.  Patient reports sore throat, AYR SALINE NASAL NA by Nasal route. Disp:  Rfl:    Vitamin C 500 MG Oral Tab Take 500 mg by mouth daily. Disp:  Rfl:    aspirin 81 MG Oral Tab Take 81 mg by mouth daily. Disp:  Rfl:    Calcium Carbonate-Vit D-Min (CALCIUM 1200 OR) Take  by mouth.  Disp:  Rf MD;  Location: [address] [place] [address]: LIG DIV&STRIPPING SHORT SAPHENOUS VEIN      Comment: right leg  No date: OTHER SURGICAL HISTORY      Comment: right shoulder sx  5/25/2016: PATIENT DOCUMENTED NOT TO HAVE EXPERIENCED ANY* N/A THROAT: Oral mucosa pink, moist. Posterior pharynx is not erythematous. no exudates. Tonsils 1/4. NECK: Supple, non-tender  LUNGS: clear to auscultation bilaterally, no wheezes or rhonchi. Breathing is non labored.   CARDIO: RRR without murmur  EXTREMITI · If symptoms are severe, rest at home for the first 2 to 3 days. When you resume activity, don't let yourself get too tired. · Avoid being exposed to cigarette smoke (yours or others’).   · You may use acetaminophen or ibuprofen to control pain and fever, © 1081-4888 [employer] [address] [facility], [facility] [place]. All rights reserved. This information is not intended as a substitute for professional medical care. Always follow your healthcare professional's instructions.

## 2018-06-14 NOTE — PATIENT INSTRUCTIONS
Humidifier in room  Sleep propped  Push fluids  Limit dairy  Mucinex as directed  Flonase daily  Can do Benadryl at night  Follow up in 3-5 days for worsening symptoms or no improvement    Viral Upper Respiratory Illness (Adult)  You have a viral upper r · Over-the-counter cold medicines will not shorten the length of time you’re sick, but they may be helpful for the following symptoms: cough, sore throat, and nasal and sinus congestion.  (Note: Do not use decongestants if you have high blood pressure.)  Fo

## 2018-06-30 PROCEDURE — 99490 CHRNC CARE MGMT STAFF 1ST 20: CPT

## 2018-07-12 ENCOUNTER — PATIENT OUTREACH (OUTPATIENT)
Dept: CASE MANAGEMENT | Age: 68
End: 2018-07-12

## 2018-07-12 DIAGNOSIS — E78.5 HYPERLIPIDEMIA, UNSPECIFIED HYPERLIPIDEMIA TYPE: ICD-10-CM

## 2018-07-12 DIAGNOSIS — G25.0 ESSENTIAL TREMOR: ICD-10-CM

## 2018-07-12 DIAGNOSIS — E11.51 DIABETES MELLITUS WITH PERIPHERAL CIRCULATORY DISORDER (HCC): ICD-10-CM

## 2018-07-12 NOTE — PROGRESS NOTES
Spoke to Lambert Allen verified for CCM call.     Medical History  Patient Active Problem List:     Essential tremor     Seizure [facility])     Lumbar spondylosis     Lumbar stenosis     Spondylolisthesis of lumbar region     Lumbar radiculitis     UTI (lower ur with medication but she is still waking up 3-4 times a night to urinate. Patient plans on following up with Dr. Yoshi Wells to see if she may increase medication.     Previous goal met: progressing    Self Management Goals/Action Plan:    • Goal from previous outre

## 2018-07-18 ENCOUNTER — NURSE ONLY (OUTPATIENT)
Dept: FAMILY MEDICINE CLINIC | Facility: CLINIC | Age: 68
End: 2018-07-18
Payer: MEDICARE

## 2018-07-18 DIAGNOSIS — E53.8 B12 DEFICIENCY: Primary | ICD-10-CM

## 2018-07-18 PROCEDURE — 96372 THER/PROPH/DIAG INJ SC/IM: CPT | Performed by: FAMILY MEDICINE

## 2018-07-18 RX ORDER — CYANOCOBALAMIN 1000 UG/ML
1000 INJECTION INTRAMUSCULAR; SUBCUTANEOUS ONCE
Status: COMPLETED | OUTPATIENT
Start: 2018-07-18 | End: 2018-07-18

## 2018-07-18 RX ADMIN — CYANOCOBALAMIN 1000 MCG: 1000 INJECTION INTRAMUSCULAR; SUBCUTANEOUS at 11:37:00

## 2018-07-31 PROCEDURE — 99490 CHRNC CARE MGMT STAFF 1ST 20: CPT

## 2018-08-10 ENCOUNTER — PATIENT OUTREACH (OUTPATIENT)
Dept: CASE MANAGEMENT | Age: 68
End: 2018-08-10

## 2018-08-10 DIAGNOSIS — E03.9 HYPOTHYROIDISM, UNSPECIFIED TYPE: ICD-10-CM

## 2018-08-10 DIAGNOSIS — E11.51 DIABETES MELLITUS WITH PERIPHERAL CIRCULATORY DISORDER (HCC): ICD-10-CM

## 2018-08-10 DIAGNOSIS — E78.5 HYPERLIPIDEMIA, UNSPECIFIED HYPERLIPIDEMIA TYPE: ICD-10-CM

## 2018-08-10 DIAGNOSIS — I10 ESSENTIAL HYPERTENSION: ICD-10-CM

## 2018-08-10 NOTE — PROGRESS NOTES
Calling patient to find out on CCM    Time Spent This Encounter Total:2 min medical record review, telephone communication, care plan updates where needed, and education. Provided acknowledgment and validation to patient's concerns.      Monthly Minute Tota

## 2018-08-14 NOTE — PROGRESS NOTES
Spoke to Lambert Allen verified for CCM call.     Medical History  Patient Active Problem List:     Essential tremor     Seizure [facility])     Lumbar spondylosis     Lumbar stenosis     Spondylolisthesis of lumbar region     Lumbar radiculitis     UTI (lower ur minutes, walking on her treadmill or going to the mall and walking early mornings. Due for labs will have drawn later this month. Assisted in scheduling Px with PCP late this month.       Previous goal met: progressing    Self Management Goals/Action Plan:

## 2018-08-16 ENCOUNTER — NURSE ONLY (OUTPATIENT)
Dept: FAMILY MEDICINE CLINIC | Facility: CLINIC | Age: 68
End: 2018-08-16
Payer: MEDICARE

## 2018-08-16 DIAGNOSIS — E53.8 VITAMIN B12 DEFICIENCY: Primary | ICD-10-CM

## 2018-08-16 PROCEDURE — 96372 THER/PROPH/DIAG INJ SC/IM: CPT | Performed by: FAMILY MEDICINE

## 2018-08-16 RX ORDER — CYANOCOBALAMIN 1000 UG/ML
1000 INJECTION INTRAMUSCULAR; SUBCUTANEOUS ONCE
Status: COMPLETED | OUTPATIENT
Start: 2018-08-16 | End: 2018-08-16

## 2018-08-16 RX ADMIN — CYANOCOBALAMIN 1000 MCG: 1000 INJECTION INTRAMUSCULAR; SUBCUTANEOUS at 12:47:00

## 2018-08-23 NOTE — TELEPHONE ENCOUNTER
Medication(s) to Refill:   Pending Prescriptions Disp Refills    POTASSIUM CHLORIDE ER 10 MEQ Oral Tab CR [Pharmacy Med Name: POTASSIUM CHLORIDE ER 10 MEQ Tablet Extended Release] 180 tablet 1     Sig: TAKE 2 TABLETS EVERY DAY             Reason for Medica

## 2018-08-24 RX ORDER — POTASSIUM CHLORIDE 750 MG/1
TABLET, EXTENDED RELEASE ORAL
Qty: 180 TABLET | Refills: 1 | Status: SHIPPED | OUTPATIENT
Start: 2018-08-24 | End: 2019-02-09

## 2018-08-28 ENCOUNTER — APPOINTMENT (OUTPATIENT)
Dept: LAB | Age: 68
End: 2018-08-28
Attending: PHYSICIAN ASSISTANT
Payer: MEDICARE

## 2018-08-28 DIAGNOSIS — E03.9 HYPOTHYROIDISM, UNSPECIFIED TYPE: ICD-10-CM

## 2018-08-28 DIAGNOSIS — E11.9 TYPE 2 DIABETES MELLITUS WITHOUT COMPLICATION, WITHOUT LONG-TERM CURRENT USE OF INSULIN (HCC): ICD-10-CM

## 2018-08-28 LAB
ALBUMIN SERPL-MCNC: 3.6 G/DL (ref 3.5–4.8)
ALBUMIN/GLOB SERPL: 1 {RATIO} (ref 1–2)
ALP LIVER SERPL-CCNC: 70 U/L (ref 55–142)
ALT SERPL-CCNC: 32 U/L (ref 14–54)
ANION GAP SERPL CALC-SCNC: 6 MMOL/L (ref 0–18)
AST SERPL-CCNC: 21 U/L (ref 15–41)
BILIRUB SERPL-MCNC: 0.4 MG/DL (ref 0.1–2)
BUN BLD-MCNC: 15 MG/DL (ref 8–20)
BUN/CREAT SERPL: 17.6 (ref 10–20)
CALCIUM BLD-MCNC: 8.9 MG/DL (ref 8.3–10.3)
CHLORIDE SERPL-SCNC: 104 MMOL/L (ref 101–111)
CO2 SERPL-SCNC: 28 MMOL/L (ref 22–32)
CREAT BLD-MCNC: 0.85 MG/DL (ref 0.55–1.02)
EST. AVERAGE GLUCOSE BLD GHB EST-MCNC: 174 MG/DL (ref 68–126)
GLOBULIN PLAS-MCNC: 3.6 G/DL (ref 2.5–4)
GLUCOSE BLD-MCNC: 144 MG/DL (ref 70–99)
HBA1C MFR BLD HPLC: 7.7 % (ref ?–5.7)
M PROTEIN MFR SERPL ELPH: 7.2 G/DL (ref 6.1–8.3)
OSMOLALITY SERPL CALC.SUM OF ELEC: 289 MOSM/KG (ref 275–295)
POTASSIUM SERPL-SCNC: 4.3 MMOL/L (ref 3.6–5.1)
SODIUM SERPL-SCNC: 138 MMOL/L (ref 136–144)
T4 FREE SERPL-MCNC: 1.1 NG/DL (ref 0.9–1.8)
TSI SER-ACNC: 2.2 MIU/ML (ref 0.35–5.5)

## 2018-08-28 PROCEDURE — 80053 COMPREHEN METABOLIC PANEL: CPT

## 2018-08-28 PROCEDURE — 84443 ASSAY THYROID STIM HORMONE: CPT

## 2018-08-28 PROCEDURE — 83036 HEMOGLOBIN GLYCOSYLATED A1C: CPT

## 2018-08-28 PROCEDURE — 84439 ASSAY OF FREE THYROXINE: CPT

## 2018-08-28 PROCEDURE — 36415 COLL VENOUS BLD VENIPUNCTURE: CPT

## 2018-08-31 ENCOUNTER — OFFICE VISIT (OUTPATIENT)
Dept: FAMILY MEDICINE CLINIC | Facility: CLINIC | Age: 68
End: 2018-08-31
Payer: MEDICARE

## 2018-08-31 VITALS
OXYGEN SATURATION: 98 % | TEMPERATURE: 99 F | RESPIRATION RATE: 16 BRPM | BODY MASS INDEX: 37.1 KG/M2 | HEIGHT: 64.5 IN | SYSTOLIC BLOOD PRESSURE: 138 MMHG | HEART RATE: 63 BPM | WEIGHT: 220 LBS | DIASTOLIC BLOOD PRESSURE: 82 MMHG

## 2018-08-31 DIAGNOSIS — I10 ESSENTIAL HYPERTENSION: ICD-10-CM

## 2018-08-31 DIAGNOSIS — E11.51 DIABETES MELLITUS WITH PERIPHERAL CIRCULATORY DISORDER (HCC): Primary | ICD-10-CM

## 2018-08-31 DIAGNOSIS — E03.9 HYPOTHYROIDISM, UNSPECIFIED TYPE: ICD-10-CM

## 2018-08-31 DIAGNOSIS — Z87.19 HISTORY OF GASTRITIS: ICD-10-CM

## 2018-08-31 DIAGNOSIS — R09.82 POST-NASAL DRIP: ICD-10-CM

## 2018-08-31 PROCEDURE — 99490 CHRNC CARE MGMT STAFF 1ST 20: CPT

## 2018-08-31 PROCEDURE — 99214 OFFICE O/P EST MOD 30 MIN: CPT | Performed by: FAMILY MEDICINE

## 2018-08-31 RX ORDER — LOSARTAN POTASSIUM AND HYDROCHLOROTHIAZIDE 25; 100 MG/1; MG/1
1 TABLET ORAL DAILY
Qty: 90 TABLET | Refills: 3 | Status: SHIPPED | OUTPATIENT
Start: 2018-08-31 | End: 2019-08-21

## 2018-08-31 NOTE — PROGRESS NOTES
· [address] Office Note  Chief Complaint:   Patient presents with:  Diabetes  Weight Check      HPI:   This is a 70year old female coming in for  HPI  Diabetes  Follow up for diabetes.    Pt denies polyuria, polydipsia, or blurre 221 lb  05/31/18 : 222 lb  02/01/18 : (P) 223 lb  01/23/18 : 223 lb  01/16/18 : 220 lb            Past Medical History:   Diagnosis Date   • Arthritis    • Back problem    • HIGH BLOOD PRESSURE    • HIGH CHOLESTEROL    • Hypothyroid    • Other and unspecif POSSIBLE BIOPSY,                POSSIBLE POLYPECTOMY 72881;  Surgeon: Sean Mead MD;  Location: [facility]  5/25/2016: PATIENT [facility] PREOPERATIVE ORDER FOR IV ANT* N/A      Comment: Procedure: COLONOSCOPY, POSSIBLE BIOPSY, DX E11.9 Disp: 1 kit Rfl: 0   Glucose Blood (ZHOU CONTOUR NEXT TEST) In Vitro Strip Test blood sugar once daily DX E11.9 Disp: 100 strip Rfl: 1   CLOPIDOGREL BISULFATE 75 MG Oral Tab TAKE 1 TABLET EVERY DAY Disp: 90 tablet Rfl: 3   METFORMIN HCL 1000 MG O Cardiovascular: Normal rate and regular rhythm. No murmur heard. Pulmonary/Chest: Effort normal and breath sounds normal. No respiratory distress. She has no wheezes. She has no rales. Abdominal: Soft.  Bowel sounds are normal. There is no tendernes radiculitis     UTI (lower urinary tract infection)     HTN (hypertension)     Seizure disorder (HCC)     DM (diabetes mellitus) ([facility] [address]     Hyperlipidemia     Onychomycosis of toenail     Pain in toes of both feet     Tyloma     Diabetes mellitus with perip

## 2018-08-31 NOTE — PATIENT INSTRUCTIONS
mucinex (guaifenesin) is a decongestant that helps thin mucous to make it easier to expel    Tips for Weight Loss    1. Portion size - look at the size of your plate - an 8-9 inch plate should be sufficient.  If you are using a 10 inch plate, do not fill co

## 2018-09-06 RX ORDER — LEVOTHYROXINE SODIUM 88 UG/1
TABLET ORAL
Qty: 90 TABLET | Refills: 3 | Status: SHIPPED | OUTPATIENT
Start: 2018-09-06 | End: 2019-08-21

## 2018-09-06 RX ORDER — SIMVASTATIN 10 MG
TABLET ORAL
Qty: 90 TABLET | Refills: 0 | Status: SHIPPED | OUTPATIENT
Start: 2018-09-06 | End: 2019-02-09

## 2018-09-06 NOTE — TELEPHONE ENCOUNTER
LOV- 8/31/2018    Last refilled- 6/11/2018  [facility]    Please approve or deny

## 2018-09-06 NOTE — TELEPHONE ENCOUNTER
Chart reviewed - pt was on 88mcg - by alternating 75mcg and 50mcg?     Restart 88mcg - repeat tsh in 3 mo

## 2018-09-13 ENCOUNTER — OFFICE VISIT (OUTPATIENT)
Dept: FAMILY MEDICINE CLINIC | Facility: CLINIC | Age: 68
End: 2018-09-13
Payer: MEDICARE

## 2018-09-13 VITALS
WEIGHT: 221 LBS | SYSTOLIC BLOOD PRESSURE: 122 MMHG | HEART RATE: 66 BPM | RESPIRATION RATE: 16 BRPM | HEIGHT: 64.5 IN | BODY MASS INDEX: 37.27 KG/M2 | DIASTOLIC BLOOD PRESSURE: 80 MMHG | OXYGEN SATURATION: 98 %

## 2018-09-13 DIAGNOSIS — I10 ESSENTIAL HYPERTENSION: ICD-10-CM

## 2018-09-13 DIAGNOSIS — E03.9 HYPOTHYROIDISM, UNSPECIFIED TYPE: ICD-10-CM

## 2018-09-13 DIAGNOSIS — Z23 NEED FOR SHINGLES VACCINE: ICD-10-CM

## 2018-09-13 DIAGNOSIS — E78.5 HYPERLIPIDEMIA, UNSPECIFIED HYPERLIPIDEMIA TYPE: ICD-10-CM

## 2018-09-13 DIAGNOSIS — E11.51 DIABETES MELLITUS WITH PERIPHERAL CIRCULATORY DISORDER (HCC): ICD-10-CM

## 2018-09-13 DIAGNOSIS — Z00.00 ENCOUNTER FOR ANNUAL HEALTH EXAMINATION: Primary | ICD-10-CM

## 2018-09-13 PROCEDURE — G0439 PPPS, SUBSEQ VISIT: HCPCS | Performed by: FAMILY MEDICINE

## 2018-09-13 NOTE — PATIENT INSTRUCTIONS
Health Maintenance  Return to office (or other lab) for fasting blood work  Eat healthy well balanced diet - majority should be protein and vegetables  Get at least 150 min of exercise per week (30 min/5 days)  Wear sunscreen - SPF 15 or higher and reapply

## 2018-09-13 NOTE — PROGRESS NOTES
HPI:   Bertin Watkins is a 70year old female who presents for a Medicare Subsequent Annual Wellness visit (Pt already had Initial Annual Wellness).     Influenza Vaccine(1) due on 09/01/2018  Diabetes Care Dilated Eye Exam due on 12/13/2018  Diabetes Care (over the last two weeks)?: Not at all  PHQ-2 SCORE: 0     Advanced Directive:   She does NOT have a Living Will on file in [facility]    The patient has this document but we do not have it in [facility], and patient is instructed to get our office a copy of it for sc NIGHT   Losartan Potassium-HCTZ 100-25 MG Oral Tab Take 1 tablet by mouth daily. POTASSIUM CHLORIDE ER 10 MEQ Oral Tab CR TAKE 2 TABLETS EVERY DAY   Oxybutynin Chloride ER 15 MG Oral Tablet 24 Hr Take 1 tablet (15 mg total) by mouth daily.    OMEPRAZOLE 2 POSSIBLE POLYPECTOMY 80167 (N/A, 5/25/2016); LUMBAR LAMINECTOMY POST LAT INTERBODY FUS 2 LEVEL (N/A, 11/25/2013); and LUMBAR / TRANSFORAMINAL EPIDURAL STEROID INJECTION (N/A, 7/18/2013).     Her family history includes Cancer in her father, mother, and chiu enlarged, symmetric, no tenderness/mass/nodules; no carotid bruit or JVD   Back:   Symmetric, no curvature, ROM normal, no CVA tenderness   Breast:  No masses or lesions, no nipple discharge, normal appearing skin, no tenderness   Lungs:   Clear to auscult indicates understanding of these issues and agrees to the plan. Reinforced healthy diet, lifestyle, and exercise. Lab work ordered. Consult ordered. Continue with current treatment plan.      Up to date on cancer screenings  Recommend shingles vaccine on: 12/13/2017   Last Dilated Eye Exam 12/13/2017       Bone Density Screening      Dexascan Every two years Last Dexa Scan:   XR DEXA BONE DENSITOMETRY (CPT=77080) 02/19/2018    No flowsheet data found.     Pap and Pelvic      Pap: Every 3 yrs age 21-65 or POTASSIUM (mmol/L)   Date Value   02/18/2014 3.9   11/16/2012 3.2 (L)    No flowsheet data found.     Creatinine  Annually CREATININE (mg/dL)   Date Value   02/18/2014 0.67     Creatinine (mg/dL)   Date Value   08/28/2018 0.85    No flowsheet data found

## 2018-09-15 ENCOUNTER — OFFICE VISIT (OUTPATIENT)
Dept: FAMILY MEDICINE CLINIC | Facility: CLINIC | Age: 68
End: 2018-09-15
Payer: MEDICARE

## 2018-09-15 DIAGNOSIS — E53.8 VITAMIN B12 DEFICIENCY: Primary | ICD-10-CM

## 2018-09-15 PROCEDURE — 96372 THER/PROPH/DIAG INJ SC/IM: CPT | Performed by: NURSE PRACTITIONER

## 2018-09-15 RX ORDER — CYANOCOBALAMIN 1000 UG/ML
1000 INJECTION INTRAMUSCULAR; SUBCUTANEOUS ONCE
Status: COMPLETED | OUTPATIENT
Start: 2018-09-15 | End: 2018-09-15

## 2018-09-15 RX ADMIN — CYANOCOBALAMIN 1000 MCG: 1000 INJECTION INTRAMUSCULAR; SUBCUTANEOUS at 09:56:00

## 2018-09-19 ENCOUNTER — PATIENT OUTREACH (OUTPATIENT)
Dept: CASE MANAGEMENT | Age: 68
End: 2018-09-19

## 2018-09-19 DIAGNOSIS — E78.5 HYPERLIPIDEMIA, UNSPECIFIED HYPERLIPIDEMIA TYPE: ICD-10-CM

## 2018-09-19 DIAGNOSIS — M48.061 SPINAL STENOSIS OF LUMBAR REGION, UNSPECIFIED WHETHER NEUROGENIC CLAUDICATION PRESENT: ICD-10-CM

## 2018-09-19 DIAGNOSIS — E11.51 DIABETES MELLITUS WITH PERIPHERAL CIRCULATORY DISORDER (HCC): ICD-10-CM

## 2018-09-19 DIAGNOSIS — E03.9 HYPOTHYROIDISM, UNSPECIFIED TYPE: ICD-10-CM

## 2018-09-19 DIAGNOSIS — I10 ESSENTIAL HYPERTENSION: ICD-10-CM

## 2018-09-21 RX ORDER — GLIPIZIDE 10 MG/1
TABLET, FILM COATED, EXTENDED RELEASE ORAL
Qty: 90 TABLET | Refills: 1 | Status: SHIPPED | OUTPATIENT
Start: 2018-09-21 | End: 2019-04-01

## 2018-09-21 NOTE — TELEPHONE ENCOUNTER
Medication(s) to Refill:   Requested Prescriptions     Pending Prescriptions Disp Refills   • GLIPIZIDE ER 10 MG Oral Tablet 24 Hr [Pharmacy Med Name: GLIPIZIDE ER 10 MG Tablet Extended Release 24 Hour] 90 tablet 1     Sig: TAKE 1 TABLET EVERY DAY

## 2018-09-24 NOTE — PROGRESS NOTES
Contacted [facility] pharmacy and states patient has refills on file for Simvastatin. They are able to fill but unfortunately Medicare will not cover it since it was recently filled. Patient may pay out of pocket for Rx.  Asked pharmacist to run prescrip

## 2018-09-24 NOTE — PROGRESS NOTES
Spoke to Lambert Allne verified for CCM call.     Medical History  Patient Active Problem List:     Essential tremor     Seizure [facility])     Lumbar spondylosis     Lumbar stenosis     Spondylolisthesis of lumbar region     Lumbar radiculitis     HTN (hypert exercise to be more tolerable.     Previous goal met: progressing    Self Management Goals/Action Plan:    · Goal from previous outreach: Low carb diet and exercise                        Patient reported progress toward goal: has been following low carb di

## 2018-09-24 NOTE — PROGRESS NOTES
Patient returned call states she is doing fine, son was recently . States her home is a mess as she is having some work done.  In the process of cleaning she misplaced her Simvastatin, she's unsure if its in a box or it was tossed in the garbage some

## 2018-09-30 PROCEDURE — 99490 CHRNC CARE MGMT STAFF 1ST 20: CPT

## 2018-10-13 ENCOUNTER — IMMUNIZATION (OUTPATIENT)
Dept: FAMILY MEDICINE CLINIC | Facility: CLINIC | Age: 68
End: 2018-10-13
Payer: MEDICARE

## 2018-10-13 DIAGNOSIS — Z23 NEED FOR VACCINATION: ICD-10-CM

## 2018-10-13 PROCEDURE — G0008 ADMIN INFLUENZA VIRUS VAC: HCPCS | Performed by: PHYSICIAN ASSISTANT

## 2018-10-13 PROCEDURE — 90686 IIV4 VACC NO PRSV 0.5 ML IM: CPT | Performed by: PHYSICIAN ASSISTANT

## 2018-10-14 ENCOUNTER — MED REC SCAN ONLY (OUTPATIENT)
Dept: FAMILY MEDICINE CLINIC | Facility: CLINIC | Age: 68
End: 2018-10-14

## 2018-10-15 ENCOUNTER — OFFICE VISIT (OUTPATIENT)
Dept: FAMILY MEDICINE CLINIC | Facility: CLINIC | Age: 68
End: 2018-10-15
Payer: MEDICARE

## 2018-10-15 DIAGNOSIS — E53.8 B12 DEFICIENCY: Primary | ICD-10-CM

## 2018-10-15 PROCEDURE — 96372 THER/PROPH/DIAG INJ SC/IM: CPT | Performed by: PHYSICIAN ASSISTANT

## 2018-10-15 RX ORDER — CYANOCOBALAMIN 1000 UG/ML
1000 INJECTION INTRAMUSCULAR; SUBCUTANEOUS ONCE
Status: COMPLETED | OUTPATIENT
Start: 2018-10-15 | End: 2018-10-15

## 2018-10-15 RX ADMIN — CYANOCOBALAMIN 1000 MCG: 1000 INJECTION INTRAMUSCULAR; SUBCUTANEOUS at 11:43:00

## 2018-10-29 ENCOUNTER — PATIENT OUTREACH (OUTPATIENT)
Dept: CASE MANAGEMENT | Age: 68
End: 2018-10-29

## 2018-10-30 NOTE — PROGRESS NOTES
LMC[facility] patient is currently in [place] visiting sister for a few more days. Time Spent This Encounter Total: 2 min medical record review, telephone communication, care plan updates where needed, and education.  Provided acknowledgment and validation to avila

## 2018-11-02 RX ORDER — CLOPIDOGREL BISULFATE 75 MG/1
TABLET ORAL
Qty: 90 TABLET | Refills: 3 | Status: SHIPPED | OUTPATIENT
Start: 2018-11-02 | End: 2019-11-13

## 2018-11-02 NOTE — TELEPHONE ENCOUNTER
Medication(s) to Refill:   Requested Prescriptions     Pending Prescriptions Disp Refills   • CLOPIDOGREL BISULFATE 75 MG Oral Tab [Pharmacy Med Name: CLOPIDOGREL 75 MG Tablet] 90 tablet 3     Sig: TAKE 1 TABLET EVERY DAY         Reason for Medication Refi

## 2018-11-06 ENCOUNTER — PATIENT OUTREACH (OUTPATIENT)
Dept: CASE MANAGEMENT | Age: 68
End: 2018-11-06

## 2018-11-06 DIAGNOSIS — E11.51 DIABETES MELLITUS WITH PERIPHERAL CIRCULATORY DISORDER (HCC): ICD-10-CM

## 2018-11-06 DIAGNOSIS — E78.5 HYPERLIPIDEMIA, UNSPECIFIED HYPERLIPIDEMIA TYPE: ICD-10-CM

## 2018-11-06 DIAGNOSIS — I10 ESSENTIAL HYPERTENSION: ICD-10-CM

## 2018-11-06 NOTE — PROGRESS NOTES
Spoke to Lambert Allen verified for CCM call.     Medical History  Patient Active Problem List:     Essential tremor     Seizure [facility])     Lumbar spondylosis     Lumbar stenosis     Spondylolisthesis of lumbar region     Lumbar radiculitis     HTN (hyperten Previous goal met: progressing     Self Management Goals/Action Plan:    · Goal from previous outreach: Low carb diet and exercise    Patient reported progress toward goal: has been following low carb diet.      up date on barrier: Patient was out of to

## 2018-11-16 ENCOUNTER — NURSE ONLY (OUTPATIENT)
Dept: FAMILY MEDICINE CLINIC | Facility: CLINIC | Age: 68
End: 2018-11-16
Payer: MEDICARE

## 2018-11-16 DIAGNOSIS — E53.8 B12 DEFICIENCY: Primary | ICD-10-CM

## 2018-11-16 PROCEDURE — 96372 THER/PROPH/DIAG INJ SC/IM: CPT | Performed by: FAMILY MEDICINE

## 2018-11-16 RX ORDER — CYANOCOBALAMIN 1000 UG/ML
1000 INJECTION INTRAMUSCULAR; SUBCUTANEOUS ONCE
Status: COMPLETED | OUTPATIENT
Start: 2018-11-16 | End: 2018-11-16

## 2018-11-16 RX ADMIN — CYANOCOBALAMIN 1000 MCG: 1000 INJECTION INTRAMUSCULAR; SUBCUTANEOUS at 10:01:00

## 2018-11-17 RX ORDER — OMEPRAZOLE 20 MG/1
CAPSULE, DELAYED RELEASE ORAL
Qty: 180 CAPSULE | Refills: 1 | Status: SHIPPED | OUTPATIENT
Start: 2018-11-17 | End: 2019-05-12

## 2018-11-17 NOTE — TELEPHONE ENCOUNTER
Medication(s) to Refill:   Requested Prescriptions     Pending Prescriptions Disp Refills   • OMEPRAZOLE 20 MG Oral Capsule Delayed Release [Pharmacy Med Name: OMEPRAZOLE 20 MG Capsule Delayed Release] 180 capsule 1     Sig: TAKE 1 CAPSULE TWICE DAILY

## 2018-11-19 NOTE — TELEPHONE ENCOUNTER
Medication(s) to Refill:   Requested Prescriptions     Pending Prescriptions Disp Refills   • METFORMIN HCL 1000 MG Oral Tab [Pharmacy Med Name: METFORMIN HCL 1000 MG Tablet] 180 tablet 3     Sig: TAKE 1 TABLET TWICE DAILY WITH MEALS         Reason for Med

## 2018-11-20 RX ORDER — BLOOD SUGAR DIAGNOSTIC
STRIP MISCELLANEOUS
Qty: 100 STRIP | Refills: 1 | Status: SHIPPED | OUTPATIENT
Start: 2018-11-20 | End: 2018-11-26

## 2018-11-20 NOTE — TELEPHONE ENCOUNTER
Medication(s) to Refill:   Requested Prescriptions     Pending Prescriptions Disp Refills   • CONTOUR NEXT TEST In Vitro Strip [Pharmacy Med Name: CONTOUR NEXT        SINGH] 100 strip 1     Sig: USE  STRIP TO CHECK GLUCOSE ONCE DAILY         Reason for Medic

## 2018-11-26 DIAGNOSIS — E11.51 DIABETES MELLITUS WITH PERIPHERAL CIRCULATORY DISORDER (HCC): Primary | ICD-10-CM

## 2018-11-26 RX ORDER — BLOOD SUGAR DIAGNOSTIC
STRIP MISCELLANEOUS
Qty: 100 STRIP | Refills: 1 | OUTPATIENT
Start: 2018-11-26

## 2018-11-26 NOTE — TELEPHONE ENCOUNTER
Medication(s) to Refill:   Requested Prescriptions      No prescriptions requested or ordered in this encounter     Refill request for contour next test strips. Missing diagnoses code.  Please advise    Reason for Medication Refill being sent to Provider /

## 2018-11-26 NOTE — TELEPHONE ENCOUNTER
Medication(s) to Refill:   Requested Prescriptions     Pending Prescriptions Disp Refills   • CONTOUR NEXT TEST In Vitro Strip [Pharmacy Med Name: CONTOUR NEXT        SINGH] 100 strip 1     Sig: USE  STRIP TO CHECK GLUCOSE ONCE DAILY           Last Time Medi

## 2018-11-29 RX ORDER — BLOOD SUGAR DIAGNOSTIC
STRIP MISCELLANEOUS
Qty: 100 STRIP | Refills: 5 | Status: SHIPPED | OUTPATIENT
Start: 2018-11-29

## 2018-11-29 NOTE — PROGRESS NOTES
Nadara Keto states they need Dx code for strips. Dr. Quan Arevalo refilled glucose strips on11/26/2018 but dx was missing in the comments. Added diabetes dx from problem list to comments and resent.      Time Spent This Encounter Total: 6 min medical record

## 2018-11-29 NOTE — TELEPHONE ENCOUNTER
Pharmacy called Glucose Strip Rx was missing diagnoses coded on comment section without Dx pharmacy is not able to chyna strips and patient is currently out of strips.    Diabetes mellitus with peripheral circulatory disorder [facility])  - Primary E11.51     Added

## 2018-11-29 NOTE — PROGRESS NOTES
Patient called states she is having difficulty refilling her test strips asked if I may touch base with pharmacy and find out the issue as she is running low. States her current weight is 217 she has been splurging a lot more over the holidays.  States

## 2018-11-30 PROCEDURE — 99490 CHRNC CARE MGMT STAFF 1ST 20: CPT

## 2018-12-12 ENCOUNTER — MED REC SCAN ONLY (OUTPATIENT)
Dept: FAMILY MEDICINE CLINIC | Facility: CLINIC | Age: 68
End: 2018-12-12

## 2018-12-12 ENCOUNTER — PATIENT OUTREACH (OUTPATIENT)
Dept: CASE MANAGEMENT | Age: 68
End: 2018-12-12

## 2018-12-12 DIAGNOSIS — E11.9 TYPE 2 DIABETES MELLITUS WITHOUT COMPLICATION, WITHOUT LONG-TERM CURRENT USE OF INSULIN (HCC): ICD-10-CM

## 2018-12-12 DIAGNOSIS — E78.5 HYPERLIPIDEMIA, UNSPECIFIED HYPERLIPIDEMIA TYPE: ICD-10-CM

## 2018-12-12 DIAGNOSIS — I10 ESSENTIAL HYPERTENSION: ICD-10-CM

## 2018-12-12 NOTE — PROGRESS NOTES
Spoke to Lambert Allen verified for CCM call.     Medical History  Patient Active Problem List:     Essential tremor     Seizure [facility])     Lumbar spondylosis     Lumbar stenosis     Spondylolisthesis of lumbar region     Lumbar radiculitis     HTN (hyperten outreach: Low carb diet and exercise     Patient reported progress toward goal: has been following low carb diet, stats she did a lot of walking in [place] this past month.      up date on barrier: States she is is trying to follow a low carb diet, special

## 2018-12-13 ENCOUNTER — NURSE ONLY (OUTPATIENT)
Dept: FAMILY MEDICINE CLINIC | Facility: CLINIC | Age: 68
End: 2018-12-13
Payer: MEDICARE

## 2018-12-13 ENCOUNTER — APPOINTMENT (OUTPATIENT)
Dept: LAB | Age: 68
End: 2018-12-13
Attending: FAMILY MEDICINE
Payer: MEDICARE

## 2018-12-13 DIAGNOSIS — E53.8 VITAMIN B12 DEFICIENCY: Primary | ICD-10-CM

## 2018-12-13 DIAGNOSIS — E53.8 VITAMIN B12 DEFICIENCY: ICD-10-CM

## 2018-12-13 DIAGNOSIS — E11.51 DIABETES MELLITUS WITH PERIPHERAL CIRCULATORY DISORDER (HCC): ICD-10-CM

## 2018-12-13 PROCEDURE — 36415 COLL VENOUS BLD VENIPUNCTURE: CPT

## 2018-12-13 PROCEDURE — 82607 VITAMIN B-12: CPT

## 2018-12-13 PROCEDURE — 83036 HEMOGLOBIN GLYCOSYLATED A1C: CPT

## 2018-12-13 PROCEDURE — 80053 COMPREHEN METABOLIC PANEL: CPT

## 2018-12-13 PROCEDURE — 96372 THER/PROPH/DIAG INJ SC/IM: CPT | Performed by: FAMILY MEDICINE

## 2018-12-13 RX ORDER — CYANOCOBALAMIN 1000 UG/ML
1000 INJECTION INTRAMUSCULAR; SUBCUTANEOUS ONCE
Status: COMPLETED | OUTPATIENT
Start: 2018-12-13 | End: 2018-12-13

## 2018-12-13 RX ADMIN — CYANOCOBALAMIN 1000 MCG: 1000 INJECTION INTRAMUSCULAR; SUBCUTANEOUS at 11:40:00

## 2018-12-31 PROCEDURE — 99490 CHRNC CARE MGMT STAFF 1ST 20: CPT

## 2019-01-10 ENCOUNTER — PATIENT OUTREACH (OUTPATIENT)
Dept: CASE MANAGEMENT | Age: 69
End: 2019-01-10

## 2019-01-10 DIAGNOSIS — E78.5 HYPERLIPIDEMIA, UNSPECIFIED HYPERLIPIDEMIA TYPE: ICD-10-CM

## 2019-01-10 DIAGNOSIS — I10 ESSENTIAL HYPERTENSION: ICD-10-CM

## 2019-01-10 DIAGNOSIS — E11.51 DIABETES MELLITUS WITH PERIPHERAL CIRCULATORY DISORDER (HCC): ICD-10-CM

## 2019-01-15 ENCOUNTER — TELEPHONE (OUTPATIENT)
Dept: FAMILY MEDICINE CLINIC | Facility: CLINIC | Age: 69
End: 2019-01-15

## 2019-01-15 NOTE — TELEPHONE ENCOUNTER
Pt had her labs drawn and then had her B 12 inj after the labs on 12/13/18. Pt was getting monthly injections. Should she continue?

## 2019-01-15 NOTE — TELEPHONE ENCOUNTER
Patient would like to verify is she should continue coming in for B12 injections. Patient had B12 drawn in November. Please advise patient. Thanks.     Viewed by Cele Mirza on 12/16/2018  6:18 PM   Written by Charline Machado MD on 12/14/2018  4:05 PM   Hi

## 2019-01-15 NOTE — PROGRESS NOTES
Spoke to Lambert Saleh verified for CCM call.     Medical History  Patient Active Problem List:     Essential tremor     Seizure [facility])     Lumbar spondylosis     Lumbar stenosis     Spondylolisthesis of lumbar region     Lumbar radiculitis     HTN (hyperten wanted to see her in office for diabetic follow up. Patient states she will follow up in march once she's had a chance to loose some weight. States she also had B12 drawn the same day and she's unsure if she should continue with biweekly B12 injections.  Me

## 2019-01-17 ENCOUNTER — NURSE ONLY (OUTPATIENT)
Dept: FAMILY MEDICINE CLINIC | Facility: CLINIC | Age: 69
End: 2019-01-17
Payer: MEDICARE

## 2019-01-17 PROCEDURE — 96372 THER/PROPH/DIAG INJ SC/IM: CPT | Performed by: FAMILY MEDICINE

## 2019-01-17 RX ORDER — CYANOCOBALAMIN 1000 UG/ML
1000 INJECTION INTRAMUSCULAR; SUBCUTANEOUS ONCE
Status: COMPLETED | OUTPATIENT
Start: 2019-01-17 | End: 2019-01-17

## 2019-01-17 RX ADMIN — CYANOCOBALAMIN 1000 MCG: 1000 INJECTION INTRAMUSCULAR; SUBCUTANEOUS at 10:52:00

## 2019-01-21 ENCOUNTER — PATIENT OUTREACH (OUTPATIENT)
Dept: FAMILY MEDICINE CLINIC | Facility: CLINIC | Age: 69
End: 2019-01-21

## 2019-01-21 DIAGNOSIS — E11.51 DIABETES MELLITUS WITH PERIPHERAL CIRCULATORY DISORDER (HCC): Primary | ICD-10-CM

## 2019-01-31 PROCEDURE — 99490 CHRNC CARE MGMT STAFF 1ST 20: CPT

## 2019-02-11 RX ORDER — SIMVASTATIN 10 MG
TABLET ORAL
Qty: 90 TABLET | Refills: 3 | Status: SHIPPED | OUTPATIENT
Start: 2019-02-11 | End: 2020-03-05

## 2019-02-11 RX ORDER — POTASSIUM CHLORIDE 750 MG/1
TABLET, EXTENDED RELEASE ORAL
Qty: 180 TABLET | Refills: 1 | Status: SHIPPED | OUTPATIENT
Start: 2019-02-11 | End: 2019-08-21

## 2019-02-11 NOTE — TELEPHONE ENCOUNTER
Medication(s) to Refill:   Requested Prescriptions     Pending Prescriptions Disp Refills   • POTASSIUM CHLORIDE ER 10 MEQ Oral Tab CR [Pharmacy Med Name: POTASSIUM CHLORIDE ER 10 MEQ Tablet Extended Release] 180 tablet 0     Sig: TAKE 2 TABLETS EVERY DAY

## 2019-02-11 NOTE — TELEPHONE ENCOUNTER
Medication(s) to Refill:   Requested Prescriptions     Pending Prescriptions Disp Refills   • SIMVASTATIN 10 MG Oral Tab [Pharmacy Med Name: SIMVASTATIN 10 MG Tablet] 90 tablet 0     Sig: TAKE 1 TABLET EVERY NIGHT         Reason for Medication Refill being

## 2019-02-15 ENCOUNTER — NURSE ONLY (OUTPATIENT)
Dept: FAMILY MEDICINE CLINIC | Facility: CLINIC | Age: 69
End: 2019-02-15
Payer: MEDICARE

## 2019-02-15 DIAGNOSIS — E53.8 B12 DEFICIENCY: Primary | ICD-10-CM

## 2019-02-15 PROCEDURE — 96372 THER/PROPH/DIAG INJ SC/IM: CPT | Performed by: FAMILY MEDICINE

## 2019-02-15 RX ORDER — CYANOCOBALAMIN 1000 UG/ML
1000 INJECTION INTRAMUSCULAR; SUBCUTANEOUS ONCE
Status: COMPLETED | OUTPATIENT
Start: 2019-02-15 | End: 2019-02-15

## 2019-02-15 RX ADMIN — CYANOCOBALAMIN 1000 MCG: 1000 INJECTION INTRAMUSCULAR; SUBCUTANEOUS at 10:38:00

## 2019-02-18 ENCOUNTER — PATIENT OUTREACH (OUTPATIENT)
Dept: CASE MANAGEMENT | Age: 69
End: 2019-02-18

## 2019-02-18 DIAGNOSIS — E78.5 HYPERLIPIDEMIA, UNSPECIFIED HYPERLIPIDEMIA TYPE: ICD-10-CM

## 2019-02-18 DIAGNOSIS — G25.0 ESSENTIAL TREMOR: ICD-10-CM

## 2019-02-18 DIAGNOSIS — E11.51 DIABETES MELLITUS WITH PERIPHERAL CIRCULATORY DISORDER (HCC): ICD-10-CM

## 2019-02-18 NOTE — PROGRESS NOTES
Spoke to Lambert Allen verified for CCM call.     Medical History  Patient Active Problem List:     Essential tremor     Seizure [facility])     Lumbar spondylosis     Lumbar stenosis     Spondylolisthesis of lumbar region     Lumbar radiculitis     HTN (hyperten from previous outreach: low carb diet and exercise                        · Patient reported progress toward goal: States she was able to walk on treadmill a few times but overall did not follow a low carb diet.     · Patient Reported New Barriers And Conc

## 2019-02-28 PROCEDURE — 99490 CHRNC CARE MGMT STAFF 1ST 20: CPT

## 2019-03-04 ENCOUNTER — TELEPHONE (OUTPATIENT)
Dept: FAMILY MEDICINE CLINIC | Facility: CLINIC | Age: 69
End: 2019-03-04

## 2019-03-04 ENCOUNTER — PATIENT OUTREACH (OUTPATIENT)
Dept: CASE MANAGEMENT | Age: 69
End: 2019-03-04

## 2019-03-04 DIAGNOSIS — E03.9 HYPOTHYROIDISM, UNSPECIFIED TYPE: ICD-10-CM

## 2019-03-04 DIAGNOSIS — E78.5 HYPERLIPIDEMIA, UNSPECIFIED HYPERLIPIDEMIA TYPE: ICD-10-CM

## 2019-03-04 DIAGNOSIS — E11.9 TYPE 2 DIABETES MELLITUS WITHOUT COMPLICATION, WITHOUT LONG-TERM CURRENT USE OF INSULIN (HCC): ICD-10-CM

## 2019-03-04 DIAGNOSIS — I10 ESSENTIAL HYPERTENSION: ICD-10-CM

## 2019-03-04 DIAGNOSIS — G25.0 ESSENTIAL TREMOR: ICD-10-CM

## 2019-03-04 NOTE — TELEPHONE ENCOUNTER
Patient called received a letter stating Losartan medication is being recalled. [address] on file to verify the batch patient has is being recalled.  Francine was not able to provide me with information as I do not have Dr. Santhosh Alaniz # only fa

## 2019-03-04 NOTE — PROGRESS NOTES
Spoke to Lambert Allen verified for CCM call.     Medical History  Patient Active Problem List:     Essential tremor     Seizure [facility])     Lumbar spondylosis     Lumbar stenosis     Spondylolisthesis of lumbar region     Lumbar radiculitis     HTN (hyperten Plan:    · Goal from previous outreach: low carb diet and exercise     Patient reported progress toward goal: Continuews with low carb diet.  States sugar levels have dropped.   Patient Reported Ongoing Barriers And Concerns: Weight gain and elevated sugars

## 2019-03-15 ENCOUNTER — NURSE ONLY (OUTPATIENT)
Dept: FAMILY MEDICINE CLINIC | Facility: CLINIC | Age: 69
End: 2019-03-15
Payer: MEDICARE

## 2019-03-15 ENCOUNTER — APPOINTMENT (OUTPATIENT)
Dept: LAB | Age: 69
End: 2019-03-15
Attending: FAMILY MEDICINE
Payer: MEDICARE

## 2019-03-15 DIAGNOSIS — E03.9 HYPOTHYROIDISM, UNSPECIFIED TYPE: ICD-10-CM

## 2019-03-15 DIAGNOSIS — E11.51 DIABETES MELLITUS WITH PERIPHERAL CIRCULATORY DISORDER (HCC): ICD-10-CM

## 2019-03-15 DIAGNOSIS — E53.8 B12 DEFICIENCY: Primary | ICD-10-CM

## 2019-03-15 LAB
ALBUMIN SERPL-MCNC: 3.7 G/DL (ref 3.4–5)
ALBUMIN/GLOB SERPL: 1.1 {RATIO} (ref 1–2)
ALP LIVER SERPL-CCNC: 74 U/L (ref 55–142)
ALT SERPL-CCNC: 37 U/L (ref 13–56)
ANION GAP SERPL CALC-SCNC: 6 MMOL/L (ref 0–18)
AST SERPL-CCNC: 27 U/L (ref 15–37)
BILIRUB SERPL-MCNC: 0.5 MG/DL (ref 0.1–2)
BUN BLD-MCNC: 16 MG/DL (ref 7–18)
BUN/CREAT SERPL: 18.4 (ref 10–20)
CALCIUM BLD-MCNC: 8.8 MG/DL (ref 8.5–10.1)
CHLORIDE SERPL-SCNC: 103 MMOL/L (ref 98–107)
CHOLEST SMN-MCNC: 151 MG/DL (ref ?–200)
CO2 SERPL-SCNC: 28 MMOL/L (ref 21–32)
CREAT BLD-MCNC: 0.87 MG/DL (ref 0.55–1.02)
CREAT UR-SCNC: 89.6 MG/DL
EST. AVERAGE GLUCOSE BLD GHB EST-MCNC: 206 MG/DL (ref 68–126)
GLOBULIN PLAS-MCNC: 3.4 G/DL (ref 2.8–4.4)
GLUCOSE BLD-MCNC: 172 MG/DL (ref 70–99)
HBA1C MFR BLD HPLC: 8.8 % (ref ?–5.7)
HDLC SERPL-MCNC: 62 MG/DL (ref 40–59)
LDLC SERPL CALC-MCNC: 74 MG/DL (ref ?–100)
M PROTEIN MFR SERPL ELPH: 7.1 G/DL (ref 6.4–8.2)
MICROALBUMIN UR-MCNC: 0.66 MG/DL
MICROALBUMIN/CREAT 24H UR-RTO: 7.4 UG/MG (ref ?–30)
NONHDLC SERPL-MCNC: 89 MG/DL (ref ?–130)
OSMOLALITY SERPL CALC.SUM OF ELEC: 289 MOSM/KG (ref 275–295)
POTASSIUM SERPL-SCNC: 4.1 MMOL/L (ref 3.5–5.1)
SODIUM SERPL-SCNC: 137 MMOL/L (ref 136–145)
T4 FREE SERPL-MCNC: 1.2 NG/DL (ref 0.8–1.7)
TRIGL SERPL-MCNC: 73 MG/DL (ref 30–149)
TSI SER-ACNC: 2.74 MIU/ML (ref 0.36–3.74)
VLDLC SERPL CALC-MCNC: 15 MG/DL (ref 0–30)

## 2019-03-15 PROCEDURE — 80061 LIPID PANEL: CPT

## 2019-03-15 PROCEDURE — 36415 COLL VENOUS BLD VENIPUNCTURE: CPT

## 2019-03-15 PROCEDURE — 84439 ASSAY OF FREE THYROXINE: CPT

## 2019-03-15 PROCEDURE — 82570 ASSAY OF URINE CREATININE: CPT

## 2019-03-15 PROCEDURE — 83036 HEMOGLOBIN GLYCOSYLATED A1C: CPT

## 2019-03-15 PROCEDURE — 96372 THER/PROPH/DIAG INJ SC/IM: CPT | Performed by: NURSE PRACTITIONER

## 2019-03-15 PROCEDURE — 84443 ASSAY THYROID STIM HORMONE: CPT

## 2019-03-15 PROCEDURE — 82043 UR ALBUMIN QUANTITATIVE: CPT

## 2019-03-15 PROCEDURE — 80053 COMPREHEN METABOLIC PANEL: CPT

## 2019-03-15 RX ORDER — CYANOCOBALAMIN 1000 UG/ML
1000 INJECTION INTRAMUSCULAR; SUBCUTANEOUS ONCE
Status: COMPLETED | OUTPATIENT
Start: 2019-03-15 | End: 2019-03-15

## 2019-03-15 RX ADMIN — CYANOCOBALAMIN 1000 MCG: 1000 INJECTION INTRAMUSCULAR; SUBCUTANEOUS at 10:19:00

## 2019-03-31 PROCEDURE — 99490 CHRNC CARE MGMT STAFF 1ST 20: CPT

## 2019-04-02 ENCOUNTER — OFFICE VISIT (OUTPATIENT)
Dept: FAMILY MEDICINE CLINIC | Facility: CLINIC | Age: 69
End: 2019-04-02
Payer: MEDICARE

## 2019-04-02 VITALS
SYSTOLIC BLOOD PRESSURE: 134 MMHG | RESPIRATION RATE: 16 BRPM | HEIGHT: 64.5 IN | OXYGEN SATURATION: 97 % | BODY MASS INDEX: 36.93 KG/M2 | WEIGHT: 219 LBS | TEMPERATURE: 99 F | DIASTOLIC BLOOD PRESSURE: 80 MMHG | HEART RATE: 64 BPM

## 2019-04-02 DIAGNOSIS — K59.01 SLOW TRANSIT CONSTIPATION: ICD-10-CM

## 2019-04-02 DIAGNOSIS — Z12.39 SCREENING FOR BREAST CANCER: ICD-10-CM

## 2019-04-02 DIAGNOSIS — E03.9 HYPOTHYROIDISM, UNSPECIFIED TYPE: ICD-10-CM

## 2019-04-02 DIAGNOSIS — E11.51 DIABETES MELLITUS WITH PERIPHERAL CIRCULATORY DISORDER (HCC): Primary | ICD-10-CM

## 2019-04-02 DIAGNOSIS — I83.893 VARICOSE VEINS OF BOTH LEGS WITH EDEMA: ICD-10-CM

## 2019-04-02 PROBLEM — I83.90 VARICOSE VEINS: Status: ACTIVE | Noted: 2019-04-02

## 2019-04-02 PROCEDURE — 99214 OFFICE O/P EST MOD 30 MIN: CPT | Performed by: FAMILY MEDICINE

## 2019-04-02 RX ORDER — GLIPIZIDE 10 MG/1
TABLET, FILM COATED, EXTENDED RELEASE ORAL
Qty: 90 TABLET | Refills: 3 | Status: SHIPPED | OUTPATIENT
Start: 2019-04-02

## 2019-04-02 NOTE — PATIENT INSTRUCTIONS
Fanny Adkins [place] [address] [address] [place] [address]  [phone]    When you are back from [facility], call or e-mail us to find out which injectable medication we are going to try for diabetes and weight loss

## 2019-04-02 NOTE — PROGRESS NOTES
[facility] Office Note  Chief Complaint:   Patient presents with:  Diabetes: f/u      HPI:   This is a 70year old female coming in for  HPI  Follow up for     Diabetes    Pt has been checking feet on a regular basis.  Pt denies an - - - - - - 0 0   PHQ4 Score - - - - - - - -       Last documented BP: 150/80      HgbA1C (%)   Date Value   03/15/2019 8.8 (H)   12/13/2018 8.0 (H)   08/28/2018 7.7 (H)       HGBA1C (%)   Date Value   06/10/2014 6.7 (H)   02/18/2014 6.6 (H)   11/25/2013 6 Problem Relation Age of Onset   • Cancer Father    • Cancer Mother         HODGKIN'S DISEASE   • Cancer Paternal Grandfather         CA OF LARYNX   • Seizure Disorder Son      Allergies:  No Known Allergies  Current Meds:    Current Outpatient Medication shortness of breath. Cardiovascular: Negative for chest pain and palpitations. Gastrointestinal: Positive for constipation. Negative for abdominal pain, blood in stool, nausea and vomiting. Genitourinary: Negative for dysuria, frequency and urgency. Ok to substitute per formulary   Samples ok     2. Hypothyroidism, unspecified type  - stable  - cont present management    3.  Slow transit constipation  Likely from hypothyroidism   May have mild gastroparesis too  Inc fiber   Consider reglan if not imp

## 2019-04-08 ENCOUNTER — PATIENT OUTREACH (OUTPATIENT)
Dept: CASE MANAGEMENT | Age: 69
End: 2019-04-08

## 2019-04-08 DIAGNOSIS — G25.0 ESSENTIAL TREMOR: ICD-10-CM

## 2019-04-08 DIAGNOSIS — E11.9 TYPE 2 DIABETES MELLITUS WITHOUT COMPLICATION, WITHOUT LONG-TERM CURRENT USE OF INSULIN (HCC): ICD-10-CM

## 2019-04-08 DIAGNOSIS — E78.5 HYPERLIPIDEMIA, UNSPECIFIED HYPERLIPIDEMIA TYPE: ICD-10-CM

## 2019-04-08 NOTE — PROGRESS NOTES
Spoke to Lambert Allen verified for CCM call.     Medical History  Patient Active Problem List:     Essential tremor     Seizure [facility])     Lumbar spondylosis     Lumbar stenosis     Spondylolisthesis of lumbar region     Lumbar radiculitis     HTN (hyperten to walk to places, or go for early morning walks. Previous goal met:Progressing    Self Management Goals/Action Plan:      · Goal from previous outreach: low carb diet and exercise    · Patient reported progress toward goal:continues with low carb diet.

## 2019-04-09 PROBLEM — I83.893 VARICOSE VEINS OF BOTH LEGS WITH EDEMA: Status: ACTIVE | Noted: 2019-04-02

## 2019-04-15 ENCOUNTER — NURSE ONLY (OUTPATIENT)
Dept: FAMILY MEDICINE CLINIC | Facility: CLINIC | Age: 69
End: 2019-04-15
Payer: MEDICARE

## 2019-04-15 PROCEDURE — 96372 THER/PROPH/DIAG INJ SC/IM: CPT | Performed by: FAMILY MEDICINE

## 2019-04-15 RX ORDER — CYANOCOBALAMIN 1000 UG/ML
1000 INJECTION INTRAMUSCULAR; SUBCUTANEOUS ONCE
Status: COMPLETED | OUTPATIENT
Start: 2019-04-15 | End: 2019-04-15

## 2019-04-15 RX ADMIN — CYANOCOBALAMIN 1000 MCG: 1000 INJECTION INTRAMUSCULAR; SUBCUTANEOUS at 09:53:00

## 2019-04-30 PROCEDURE — 99490 CHRNC CARE MGMT STAFF 1ST 20: CPT

## 2019-05-13 ENCOUNTER — NURSE ONLY (OUTPATIENT)
Dept: FAMILY MEDICINE CLINIC | Facility: CLINIC | Age: 69
End: 2019-05-13
Payer: MEDICARE

## 2019-05-13 DIAGNOSIS — E53.8 B12 DEFICIENCY: Primary | ICD-10-CM

## 2019-05-13 PROCEDURE — 96372 THER/PROPH/DIAG INJ SC/IM: CPT | Performed by: FAMILY MEDICINE

## 2019-05-13 RX ORDER — CYANOCOBALAMIN 1000 UG/ML
1000 INJECTION INTRAMUSCULAR; SUBCUTANEOUS ONCE
Status: COMPLETED | OUTPATIENT
Start: 2019-05-13 | End: 2019-05-13

## 2019-05-13 RX ORDER — OMEPRAZOLE 20 MG/1
CAPSULE, DELAYED RELEASE ORAL
Qty: 180 CAPSULE | Refills: 1 | Status: SHIPPED | OUTPATIENT
Start: 2019-05-13 | End: 2019-12-11

## 2019-05-13 RX ADMIN — CYANOCOBALAMIN 1000 MCG: 1000 INJECTION INTRAMUSCULAR; SUBCUTANEOUS at 11:38:00

## 2019-05-24 ENCOUNTER — PATIENT OUTREACH (OUTPATIENT)
Dept: CASE MANAGEMENT | Age: 69
End: 2019-05-24

## 2019-05-24 DIAGNOSIS — E11.9 TYPE 2 DIABETES MELLITUS WITHOUT COMPLICATION, WITHOUT LONG-TERM CURRENT USE OF INSULIN (HCC): ICD-10-CM

## 2019-05-24 DIAGNOSIS — G25.0 ESSENTIAL TREMOR: ICD-10-CM

## 2019-05-24 DIAGNOSIS — E78.5 HYPERLIPIDEMIA, UNSPECIFIED HYPERLIPIDEMIA TYPE: ICD-10-CM

## 2019-05-24 DIAGNOSIS — E03.9 HYPOTHYROIDISM, UNSPECIFIED TYPE: ICD-10-CM

## 2019-05-28 ENCOUNTER — HOSPITAL ENCOUNTER (OUTPATIENT)
Dept: MAMMOGRAPHY | Age: 69
Discharge: HOME | End: 2019-05-28
Attending: FAMILY MEDICINE
Payer: MEDICARE

## 2019-05-28 DIAGNOSIS — Z12.39 SCREENING FOR BREAST CANCER: ICD-10-CM

## 2019-05-28 PROCEDURE — 77067 SCR MAMMO BI INCL CAD: CPT | Performed by: FAMILY MEDICINE

## 2019-05-29 NOTE — PROGRESS NOTES
Spoke to Lambert Allen verified for CCM call.     Medical History  Patient Active Problem List:     Essential tremor     Seizure [facility])     Lumbar spondylosis     Lumbar stenosis     Spondylolisthesis of lumbar region     Lumbar radiculitis     HTN (hyperten Previous goal met:Progressing     Self Management Goals/Action Plan:  · Goal from previous outreach: low carb diet and exercise     · Patient reported progress toward goal:continues with low carb diet. Home sugar reading have been improving.   ·   · Brandy Phan

## 2019-05-31 PROCEDURE — 99490 CHRNC CARE MGMT STAFF 1ST 20: CPT

## 2019-06-14 ENCOUNTER — NURSE ONLY (OUTPATIENT)
Dept: FAMILY MEDICINE CLINIC | Facility: CLINIC | Age: 69
End: 2019-06-14
Payer: MEDICARE

## 2019-06-14 DIAGNOSIS — E53.8 B12 DEFICIENCY: Primary | ICD-10-CM

## 2019-06-14 PROCEDURE — 96372 THER/PROPH/DIAG INJ SC/IM: CPT | Performed by: FAMILY MEDICINE

## 2019-06-14 RX ORDER — CYANOCOBALAMIN 1000 UG/ML
1000 INJECTION INTRAMUSCULAR; SUBCUTANEOUS ONCE
Status: COMPLETED | OUTPATIENT
Start: 2019-06-14 | End: 2019-06-14

## 2019-06-14 RX ADMIN — CYANOCOBALAMIN 1000 MCG: 1000 INJECTION INTRAMUSCULAR; SUBCUTANEOUS at 11:04:00

## 2019-06-17 ENCOUNTER — PATIENT OUTREACH (OUTPATIENT)
Dept: CASE MANAGEMENT | Age: 69
End: 2019-06-17

## 2019-06-17 DIAGNOSIS — E78.5 HYPERLIPIDEMIA, UNSPECIFIED HYPERLIPIDEMIA TYPE: ICD-10-CM

## 2019-06-17 DIAGNOSIS — I83.893 VARICOSE VEINS OF BOTH LEGS WITH EDEMA: ICD-10-CM

## 2019-06-17 DIAGNOSIS — E11.9 TYPE 2 DIABETES MELLITUS WITHOUT COMPLICATION, WITHOUT LONG-TERM CURRENT USE OF INSULIN (HCC): ICD-10-CM

## 2019-06-17 DIAGNOSIS — I10 ESSENTIAL HYPERTENSION: ICD-10-CM

## 2019-06-25 ENCOUNTER — APPOINTMENT (OUTPATIENT)
Dept: LAB | Age: 69
End: 2019-06-25
Attending: FAMILY MEDICINE
Payer: MEDICARE

## 2019-06-25 DIAGNOSIS — E11.51 DIABETES MELLITUS WITH PERIPHERAL CIRCULATORY DISORDER (HCC): ICD-10-CM

## 2019-06-25 PROCEDURE — 80061 LIPID PANEL: CPT

## 2019-06-25 PROCEDURE — 36415 COLL VENOUS BLD VENIPUNCTURE: CPT

## 2019-06-25 PROCEDURE — 80053 COMPREHEN METABOLIC PANEL: CPT

## 2019-06-25 PROCEDURE — 83036 HEMOGLOBIN GLYCOSYLATED A1C: CPT

## 2019-06-26 NOTE — PROGRESS NOTES
Spoke to Lambert Allen verified for CCM call.     Medical History  Patient Active Problem List:     Essential tremor     Seizure [facility])     Lumbar spondylosis     Lumbar stenosis     Spondylolisthesis of lumbar region     Lumbar radiculitis     HTN (hyperten started PT two times a week. Feels like its helping.      Plans on taking care f her varicose veins, states she will discuss with Dr. Mauro Holter at next visit July 02,2019     Previous goal met:Progressing (Continue with previous goal)    Self Management Goals/A

## 2019-06-30 PROCEDURE — 99490 CHRNC CARE MGMT STAFF 1ST 20: CPT

## 2019-07-02 ENCOUNTER — OFFICE VISIT (OUTPATIENT)
Dept: FAMILY MEDICINE CLINIC | Facility: CLINIC | Age: 69
End: 2019-07-02
Payer: MEDICARE

## 2019-07-02 VITALS
WEIGHT: 217 LBS | HEIGHT: 64.5 IN | SYSTOLIC BLOOD PRESSURE: 132 MMHG | HEART RATE: 60 BPM | BODY MASS INDEX: 36.6 KG/M2 | TEMPERATURE: 98 F | DIASTOLIC BLOOD PRESSURE: 70 MMHG | OXYGEN SATURATION: 99 % | RESPIRATION RATE: 16 BRPM

## 2019-07-02 DIAGNOSIS — I83.893 VARICOSE VEINS OF BOTH LEGS WITH EDEMA: ICD-10-CM

## 2019-07-02 DIAGNOSIS — M75.122 COMPLETE TEAR OF LEFT ROTATOR CUFF, UNSPECIFIED WHETHER TRAUMATIC: ICD-10-CM

## 2019-07-02 DIAGNOSIS — E11.51 DIABETES MELLITUS WITH PERIPHERAL CIRCULATORY DISORDER (HCC): Primary | ICD-10-CM

## 2019-07-02 PROCEDURE — 99214 OFFICE O/P EST MOD 30 MIN: CPT | Performed by: FAMILY MEDICINE

## 2019-07-02 RX ORDER — PEN NEEDLE, DIABETIC 30 GX3/16"
1 NEEDLE, DISPOSABLE MISCELLANEOUS
Qty: 30 EACH | Refills: 0 | Status: SHIPPED | OUTPATIENT
Start: 2019-07-02

## 2019-07-02 NOTE — PROGRESS NOTES
[facility] Office Note  Chief Complaint:   Patient presents with:  Diabetes: f/u  Shoulder Pain: left shoulder pain, PT 3x a week       HPI:   This is a 78year old female coming in for  HPI    Left Shoulder pain   2011 - was comp 6.7 (H)   02/18/2014 6.6 (H)   11/25/2013 6.3 (H)       Past Medical History:   Diagnosis Date   • Arthritis    • Back problem    • HIGH BLOOD PRESSURE    • HIGH CHOLESTEROL    • Hypothyroid    • Other and unspecified hyperlipidemia    • Seizure disorder ( Meds:    Current Outpatient Medications:  Semaglutide (OZEMPIC) 0.25 or 0.5 MG/DOSE Subcutaneous Solution Pen-injector Inject 0.25 mg into the skin every 7 days for 28 days.  Disp: 1 pen Rfl: 0   Insulin Pen Needle (PEN NEEDLES) 32G X 4 MM Does not apply Mi pain and visual disturbance. Respiratory: Negative for cough and shortness of breath. Cardiovascular: Negative for chest pain and palpitations. Gastrointestinal: Negative for abdominal pain, nausea and vomiting.    Genitourinary: Negative for dysuria with peripheral circulatory disorder (HCC)  - COMP METABOLIC PANEL (14); Future  - LIPID PANEL; Future  - HEMOGLOBIN A1C; Future  Has improved  Cont current medications  Cont weight loss     2.  Complete tear of left rotator cuff, unspecified whether trauma

## 2019-07-05 ENCOUNTER — PATIENT OUTREACH (OUTPATIENT)
Dept: CASE MANAGEMENT | Age: 69
End: 2019-07-05

## 2019-07-05 DIAGNOSIS — E78.5 HYPERLIPIDEMIA, UNSPECIFIED HYPERLIPIDEMIA TYPE: ICD-10-CM

## 2019-07-05 DIAGNOSIS — I10 ESSENTIAL HYPERTENSION: ICD-10-CM

## 2019-07-05 DIAGNOSIS — E11.51 DIABETES MELLITUS WITH PERIPHERAL CIRCULATORY DISORDER (HCC): ICD-10-CM

## 2019-07-05 NOTE — PROGRESS NOTES
Spoke to Lambert Allen verified for CCM call.     Medical History  Patient Active Problem List:     Essential tremor     Seizure [facility])     Lumbar spondylosis     Lumbar stenosis     Spondylolisthesis of lumbar region     Lumbar radiculitis     HTN (hyperten states she feels it has been improving.     Previous goal met:Progressing     Self Management Goals/Action Plan:    · Goal from previous outreach: low carb diet and exercise     · Patient reported progress toward goal:Continues with a low carb diet, states

## 2019-07-17 ENCOUNTER — NURSE ONLY (OUTPATIENT)
Dept: FAMILY MEDICINE CLINIC | Facility: CLINIC | Age: 69
End: 2019-07-17
Payer: MEDICARE

## 2019-07-17 PROCEDURE — 96372 THER/PROPH/DIAG INJ SC/IM: CPT | Performed by: FAMILY MEDICINE

## 2019-07-17 RX ORDER — CYANOCOBALAMIN 1000 UG/ML
1000 INJECTION INTRAMUSCULAR; SUBCUTANEOUS ONCE
Status: COMPLETED | OUTPATIENT
Start: 2019-07-17 | End: 2019-07-17

## 2019-07-17 RX ADMIN — CYANOCOBALAMIN 1000 MCG: 1000 INJECTION INTRAMUSCULAR; SUBCUTANEOUS at 11:55:00

## 2019-07-31 ENCOUNTER — MED REC SCAN ONLY (OUTPATIENT)
Dept: FAMILY MEDICINE CLINIC | Facility: CLINIC | Age: 69
End: 2019-07-31

## 2019-07-31 PROCEDURE — 99490 CHRNC CARE MGMT STAFF 1ST 20: CPT

## 2019-08-05 ENCOUNTER — PATIENT OUTREACH (OUTPATIENT)
Dept: CASE MANAGEMENT | Age: 69
End: 2019-08-05

## 2019-08-06 ENCOUNTER — TELEPHONE (OUTPATIENT)
Dept: FAMILY MEDICINE CLINIC | Facility: CLINIC | Age: 69
End: 2019-08-06

## 2019-08-06 ENCOUNTER — PATIENT OUTREACH (OUTPATIENT)
Dept: CASE MANAGEMENT | Age: 69
End: 2019-08-06

## 2019-08-06 DIAGNOSIS — I10 ESSENTIAL HYPERTENSION: ICD-10-CM

## 2019-08-06 DIAGNOSIS — I83.893 VARICOSE VEINS OF BOTH LEGS WITH EDEMA: ICD-10-CM

## 2019-08-06 DIAGNOSIS — G25.0 ESSENTIAL TREMOR: ICD-10-CM

## 2019-08-06 DIAGNOSIS — E11.51 DIABETES MELLITUS WITH PERIPHERAL CIRCULATORY DISORDER (HCC): ICD-10-CM

## 2019-08-06 DIAGNOSIS — E78.5 HYPERLIPIDEMIA, UNSPECIFIED HYPERLIPIDEMIA TYPE: ICD-10-CM

## 2019-08-06 NOTE — PROGRESS NOTES
8/6/2019  Spoke to Tiffany for CCM.       Updates to patient care team/ comments: None  Patient reported changes in medications: None  Med Adherence  Comment: Taking as directed     Health Maintenance: Up to date  Influenza Vaccine(1) due on 09/01/2019  [facility] confident               - confidence: : 5      Care Manager Follow Up: 1 month  Reason For Follow Up: review progress and or barriers towards patients goals.      Care managers interventions: Praised pt on wt loss, excellent diabetic control, and exercising

## 2019-08-06 NOTE — TELEPHONE ENCOUNTER
ELE: pt did got to [facility] and saw Dr. Nickolas Puente. She will have a consult on 8/8/19 and from there they will plan treatment. Also pt had MRI of lower back with chiropractor and will have a copy sent for her record.

## 2019-08-15 ENCOUNTER — NURSE ONLY (OUTPATIENT)
Dept: FAMILY MEDICINE CLINIC | Facility: CLINIC | Age: 69
End: 2019-08-15
Payer: MEDICARE

## 2019-08-15 DIAGNOSIS — E53.8 VITAMIN B12 DEFICIENCY: Primary | ICD-10-CM

## 2019-08-15 PROCEDURE — 96372 THER/PROPH/DIAG INJ SC/IM: CPT | Performed by: FAMILY MEDICINE

## 2019-08-15 RX ORDER — CYANOCOBALAMIN 1000 UG/ML
1000 INJECTION INTRAMUSCULAR; SUBCUTANEOUS ONCE
Status: COMPLETED | OUTPATIENT
Start: 2019-08-15 | End: 2019-08-15

## 2019-08-15 RX ADMIN — CYANOCOBALAMIN 1000 MCG: 1000 INJECTION INTRAMUSCULAR; SUBCUTANEOUS at 09:07:00

## 2019-08-15 NOTE — TELEPHONE ENCOUNTER
Medication(s) to Refill:   Requested Prescriptions     Pending Prescriptions Disp Refills   • OZEMPIC 0.25 or 0.5 MG/DOSE Subcutaneous Solution Pen-injector [Pharmacy Med Name: Illona Pouch 2MG/1.5ML 0.25/.5 PEN] 1 pen 0     Sig: INJECT 0.25 MG INTO THE SKIN EV

## 2019-08-20 ENCOUNTER — PATIENT OUTREACH (OUTPATIENT)
Dept: CASE MANAGEMENT | Age: 69
End: 2019-08-20

## 2019-08-20 NOTE — PROGRESS NOTES
Patient called states she is well states Ozempic is too expensive it went from $300 for one month to $500 states she is not able to afford it. Would like assistance with finding a substitute.      Time with pt - 8  min  Chart review -3  min  Total time - 31

## 2019-08-20 NOTE — PROGRESS NOTES
Contacted patient and advised. States she will fill Rx for 3 month as it will save her money. States she is well plans to move to [place] in January. Patient dicussed plans to move and leave her home to her daughter.  States she will be traveling to [place]

## 2019-08-20 NOTE — PROGRESS NOTES
Megadyne refill sent to pharmacy was for a 3 month supply that's why its $500.00 Pharmacist states medication is actually cheaper for her if she purchases a 3 month supply as patient will be receiving 24 doses.        Contacting patient to

## 2019-08-21 RX ORDER — LEVOTHYROXINE SODIUM 88 UG/1
TABLET ORAL
Qty: 90 TABLET | Refills: 0 | Status: SHIPPED | OUTPATIENT
Start: 2019-08-21 | End: 2019-11-13

## 2019-08-21 RX ORDER — POTASSIUM CHLORIDE 750 MG/1
TABLET, EXTENDED RELEASE ORAL
Qty: 180 TABLET | Refills: 1 | Status: SHIPPED | OUTPATIENT
Start: 2019-08-21 | End: 2020-03-05

## 2019-08-21 RX ORDER — LOSARTAN POTASSIUM AND HYDROCHLOROTHIAZIDE 25; 100 MG/1; MG/1
TABLET ORAL
Qty: 90 TABLET | Refills: 3 | Status: SHIPPED | OUTPATIENT
Start: 2019-08-21

## 2019-08-21 NOTE — TELEPHONE ENCOUNTER
Medication(s) to Refill:   Requested Prescriptions     Pending Prescriptions Disp Refills   • LEVOTHYROXINE SODIUM 88 MCG Oral Tab [Pharmacy Med Name: LEVOTHYROXINE SODIUM 88 MCG Tablet] 90 tablet 0     Sig: TAKE 1 TABLET EVERY DAY BEFORE BREAKFAST   • POT

## 2019-08-31 PROCEDURE — 99490 CHRNC CARE MGMT STAFF 1ST 20: CPT

## 2019-09-03 ENCOUNTER — NURSE ONLY (OUTPATIENT)
Dept: FAMILY MEDICINE CLINIC | Facility: CLINIC | Age: 69
End: 2019-09-03
Payer: MEDICARE

## 2019-09-03 VITALS
TEMPERATURE: 99 F | SYSTOLIC BLOOD PRESSURE: 134 MMHG | OXYGEN SATURATION: 98 % | BODY MASS INDEX: 34.74 KG/M2 | RESPIRATION RATE: 18 BRPM | WEIGHT: 206 LBS | HEIGHT: 64.5 IN | DIASTOLIC BLOOD PRESSURE: 72 MMHG | HEART RATE: 66 BPM

## 2019-09-03 DIAGNOSIS — E11.51 DIABETES MELLITUS WITH PERIPHERAL CIRCULATORY DISORDER (HCC): ICD-10-CM

## 2019-09-03 DIAGNOSIS — J06.9 ACUTE URI: Primary | ICD-10-CM

## 2019-09-03 PROCEDURE — 99213 OFFICE O/P EST LOW 20 MIN: CPT | Performed by: NURSE PRACTITIONER

## 2019-09-03 RX ORDER — AZITHROMYCIN 250 MG/1
TABLET, FILM COATED ORAL
Qty: 6 TABLET | Refills: 0 | Status: SHIPPED | OUTPATIENT
Start: 2019-09-03

## 2019-09-04 NOTE — PROGRESS NOTES
CHIEF COMPLAINT:   Patient presents with:  Nasal Congestion: Post nasal drip, headache, nausea, coughing up some flem, X 5 days       HPI:   Tomas Cleveland is a 78year old female who presents for upper respiratory symptoms for  5 days.  Patient reports sor Disp:  Rfl:    Vitamin C 500 MG Oral Tab Take 500 mg by mouth daily. Disp:  Rfl:    aspirin 81 MG Oral Tab Take 81 mg by mouth daily. Disp:  Rfl:    Calcium Carbonate-Vit D-Min (CALCIUM 1200 OR) Take  by mouth.  Disp:  Rfl:    Cholecalciferol (VITAMIN D3) 3 Smokeless tobacco: Never Used    Alcohol use: Yes      Comment: 3-4 drinks per month    Drug use: No        REVIEW OF SYSTEMS:   GENERAL: fatigue, malaise, normal appetite  SKIN: no rashes or abnormal skin lesions  HEENT: See HPI  LUNGS: denies shortness o these issues and agrees to the plan. The patient is asked to return if sx's persist or worsen.

## 2019-09-10 ENCOUNTER — PATIENT OUTREACH (OUTPATIENT)
Dept: CASE MANAGEMENT | Age: 69
End: 2019-09-10

## 2019-09-10 DIAGNOSIS — G25.0 ESSENTIAL TREMOR: ICD-10-CM

## 2019-09-10 DIAGNOSIS — E78.5 HYPERLIPIDEMIA, UNSPECIFIED HYPERLIPIDEMIA TYPE: ICD-10-CM

## 2019-09-10 DIAGNOSIS — M54.16 LUMBAR RADICULITIS: ICD-10-CM

## 2019-09-10 DIAGNOSIS — I83.893 VARICOSE VEINS OF BOTH LEGS WITH EDEMA: ICD-10-CM

## 2019-09-10 DIAGNOSIS — E11.51 DIABETES MELLITUS WITH PERIPHERAL CIRCULATORY DISORDER (HCC): ICD-10-CM

## 2019-09-10 NOTE — PROGRESS NOTES
Contacting patient to follow up on CCM for the month.  LMCB       Time with pt -  min  Chart review -3  min  Total time - 3 min

## 2019-09-10 NOTE — PROGRESS NOTES
9/10/2019  Spoke to Francisco Rojo for CCM. Updates to patient care team/ comments: [facility]. Patient reported changes in medications: Ozempic 0.25 mg injected into skin every 7 days.     Med Adherence  Comment: Compli Cortizone injection was placed back pain is subsiding.     • Patient Reported New Barriers And Concerns: Snacking late at night                   - Plan for overcoming all barriers: Plans to set an alarm for 7:00 pm  To remind her to stop snacking or eating

## 2019-09-16 ENCOUNTER — NURSE ONLY (OUTPATIENT)
Dept: FAMILY MEDICINE CLINIC | Facility: CLINIC | Age: 69
End: 2019-09-16
Payer: MEDICARE

## 2019-09-16 DIAGNOSIS — E53.8 B12 DEFICIENCY: Primary | ICD-10-CM

## 2019-09-16 PROCEDURE — 96372 THER/PROPH/DIAG INJ SC/IM: CPT | Performed by: FAMILY MEDICINE

## 2019-09-16 RX ORDER — CYANOCOBALAMIN 1000 UG/ML
1000 INJECTION INTRAMUSCULAR; SUBCUTANEOUS ONCE
Status: COMPLETED | OUTPATIENT
Start: 2019-09-16 | End: 2019-09-16

## 2019-09-16 RX ADMIN — CYANOCOBALAMIN 1000 MCG: 1000 INJECTION INTRAMUSCULAR; SUBCUTANEOUS at 12:55:00

## 2019-09-18 ENCOUNTER — OFFICE VISIT (OUTPATIENT)
Dept: FAMILY MEDICINE CLINIC | Facility: CLINIC | Age: 69
End: 2019-09-18
Payer: MEDICARE

## 2019-09-18 VITALS
WEIGHT: 206 LBS | HEIGHT: 64.5 IN | TEMPERATURE: 98 F | RESPIRATION RATE: 16 BRPM | BODY MASS INDEX: 34.74 KG/M2 | HEART RATE: 79 BPM | SYSTOLIC BLOOD PRESSURE: 124 MMHG | OXYGEN SATURATION: 97 % | DIASTOLIC BLOOD PRESSURE: 80 MMHG

## 2019-09-18 DIAGNOSIS — R93.89 ABNORMAL ULTRASOUND OF CAROTID ARTERY: ICD-10-CM

## 2019-09-18 DIAGNOSIS — I65.23 CAROTID ATHEROSCLEROSIS, BILATERAL: ICD-10-CM

## 2019-09-18 DIAGNOSIS — E53.8 VITAMIN B12 DEFICIENCY: ICD-10-CM

## 2019-09-18 DIAGNOSIS — I83.893 VARICOSE VEINS OF BOTH LEGS WITH EDEMA: ICD-10-CM

## 2019-09-18 DIAGNOSIS — G25.0 ESSENTIAL TREMOR: ICD-10-CM

## 2019-09-18 DIAGNOSIS — E78.5 HYPERLIPIDEMIA, UNSPECIFIED HYPERLIPIDEMIA TYPE: ICD-10-CM

## 2019-09-18 DIAGNOSIS — E11.9 TYPE 2 DIABETES MELLITUS WITHOUT COMPLICATION, WITHOUT LONG-TERM CURRENT USE OF INSULIN (HCC): ICD-10-CM

## 2019-09-18 DIAGNOSIS — I10 ESSENTIAL HYPERTENSION: ICD-10-CM

## 2019-09-18 DIAGNOSIS — Z00.00 ENCOUNTER FOR ANNUAL HEALTH EXAMINATION: Primary | ICD-10-CM

## 2019-09-18 DIAGNOSIS — E03.9 HYPOTHYROIDISM, UNSPECIFIED TYPE: ICD-10-CM

## 2019-09-18 PROCEDURE — G0439 PPPS, SUBSEQ VISIT: HCPCS | Performed by: FAMILY MEDICINE

## 2019-09-18 NOTE — PATIENT INSTRUCTIONS
Mackenzie Tabares's SCREENING SCHEDULE   Tests on this list are recommended by your physician but may not be covered, or covered at this frequency, by your insurer. Please check with your insurance carrier before scheduling to verify coverage.    PREVENTATIV who meet one of the following criteria:   • Men who are 73-68 years old and have smoked more than 100 cigarettes in their lifetime   • Anyone with a family history    Colorectal Cancer Screening  Covered up to Age 76     Colonoscopy Screen   Covered every regularly   Immunizations      Influenza  Covered Annually Orders placed or performed in visit on 10/05/16   • FLU VAC NO PRSV 4 CHRISTIANO 3 YRS+   Orders placed or performed in visit on 10/16/15   • FLU VAC NO PRSV 4 CHRISTIANO 3 YRS+   Orders placed or performed in v definitions of the different types of Advance Directives. It also has the State forms available on it's website for anyone to review and print using their home computer and printer. (the forms are also available in [address])  www. putitinwriting. org  This li

## 2019-09-18 NOTE — PROGRESS NOTES
HPI:   Jacqui Da Silva is a 78year old female who presents for a Medicare Subsequent Annual Wellness visit (Pt already had Initial Annual Wellness).     Annual Physical due on 09/13/2019  Influenza Vaccine(1) due on 09/01/2019  Diabetes Care A1C due on 09 file in [address]    Advance care planning including the explanation and discussion of advance directives standard forms performed Face to Face with patient and Family/surrogate (if present), and forms available to patient in AVS         She has never smoked to 08/30/2016    HGB 11.5 (L) 08/30/2016    .0 08/30/2016        ALLERGIES:   She has No Known Allergies.     CURRENT MEDICATIONS:     Outpatient Medications Marked as Taking for the 9/18/19 encounter (Office Visit) with Fer Diaz MD:  Semaglutide ( stated as uncontrolled, Unspecified disorder of thyroid, Unspecified essential hypertension, and Varicose veins.     She  has a past surgical history that includes other surgical history; injection, w/wo contrast, dx/therapeutic substance, epidural/subarach following:    Height as of this encounter: 64.5\". Weight as of this encounter: 206 lb.     Medicare Hearing Assessment  (Required for AWV/SWV)    Questionnaire           General Appearance:  Alert, cooperative, no distress, appears stated age   Head:  N PLAN SUMMARY:   Diagnoses and all orders for this visit:    Encounter for annual health examination    Hypothyroidism, unspecified type  -     TSH+FREE T4; Future   Cont levothyroxine    Essential hypertension   Controlled    Losartan-hctz    Type 2 di (FSB)Annually Glucose (mg/dL)   Date Value   06/25/2019 145 (H)     GLUCOSE (mg/dL)   Date Value   02/18/2014 88          Cardiovascular Disease Screening     LDL Annually LDL Cholesterol (mg/dL)   Date Value   06/25/2019 67     LDL CHOLESTROL (mg/dL)   Da disease   Hemophiliacs who received Factor VIII or IX concentrates   Clients of institutions for the mentally retarded   Persons who live in the same house as a HepB virus carrier   Homosexual men   Illicit injectable drug abusers     Tetanus Toxoid  Only

## 2019-09-20 ENCOUNTER — PATIENT MESSAGE (OUTPATIENT)
Dept: FAMILY MEDICINE CLINIC | Facility: CLINIC | Age: 69
End: 2019-09-20

## 2019-09-23 NOTE — TELEPHONE ENCOUNTER
Ok to cont for several years if needed -- no absolute rule on how long to use.    We would hope that if weight loss occurs then once medication stopped she will not need any other medications added on, but additional oral medications like jardiance, tradjen

## 2019-09-30 PROCEDURE — 99490 CHRNC CARE MGMT STAFF 1ST 20: CPT

## 2019-10-04 ENCOUNTER — LAB ENCOUNTER (OUTPATIENT)
Dept: LAB | Age: 69
End: 2019-10-04
Attending: FAMILY MEDICINE
Payer: MEDICARE

## 2019-10-04 DIAGNOSIS — I83.893 VARICOSE VEINS OF BOTH LEGS WITH EDEMA: ICD-10-CM

## 2019-10-04 DIAGNOSIS — E11.51 DIABETES MELLITUS WITH PERIPHERAL CIRCULATORY DISORDER (HCC): ICD-10-CM

## 2019-10-04 DIAGNOSIS — G25.0 ESSENTIAL TREMOR: ICD-10-CM

## 2019-10-04 DIAGNOSIS — E03.9 HYPOTHYROIDISM, UNSPECIFIED TYPE: ICD-10-CM

## 2019-10-04 DIAGNOSIS — E53.8 VITAMIN B12 DEFICIENCY: ICD-10-CM

## 2019-10-04 PROCEDURE — 36415 COLL VENOUS BLD VENIPUNCTURE: CPT

## 2019-10-04 PROCEDURE — 84439 ASSAY OF FREE THYROXINE: CPT

## 2019-10-04 PROCEDURE — 80053 COMPREHEN METABOLIC PANEL: CPT

## 2019-10-04 PROCEDURE — 84443 ASSAY THYROID STIM HORMONE: CPT

## 2019-10-04 PROCEDURE — 83036 HEMOGLOBIN GLYCOSYLATED A1C: CPT

## 2019-10-04 PROCEDURE — 80061 LIPID PANEL: CPT

## 2019-10-04 PROCEDURE — 85025 COMPLETE CBC W/AUTO DIFF WBC: CPT

## 2019-10-08 ENCOUNTER — PATIENT OUTREACH (OUTPATIENT)
Dept: CASE MANAGEMENT | Age: 69
End: 2019-10-08

## 2019-10-08 DIAGNOSIS — E78.5 HYPERLIPIDEMIA, UNSPECIFIED HYPERLIPIDEMIA TYPE: ICD-10-CM

## 2019-10-08 DIAGNOSIS — M54.16 LUMBAR RADICULITIS: ICD-10-CM

## 2019-10-08 DIAGNOSIS — E11.51 DIABETES MELLITUS WITH PERIPHERAL CIRCULATORY DISORDER (HCC): ICD-10-CM

## 2019-10-08 DIAGNOSIS — I10 ESSENTIAL HYPERTENSION: ICD-10-CM

## 2019-10-08 NOTE — PROGRESS NOTES
10/8/2019  Spoke to Tiffany for CCM.       Updates to patient care team/ comments: None  Patient reported changes in medications: None  Med Adherence  Comment: Taking as directed    Health Maintenance:  Influenza Vaccine(1) due on 09/01/2019 plans to go to [place] agrees to goal action plan. yes  Self-Management Abilities (patient reported)             1= least confident in achieving goal, 5= very confident               - confidence: 5    Care Manager Follow Up:  One month    Reason For Follow Up: review progress an

## 2019-10-15 ENCOUNTER — OFFICE VISIT (OUTPATIENT)
Dept: FAMILY MEDICINE CLINIC | Facility: CLINIC | Age: 69
End: 2019-10-15
Payer: MEDICARE

## 2019-10-15 DIAGNOSIS — Z23 NEED FOR VACCINATION: Primary | ICD-10-CM

## 2019-10-15 PROCEDURE — G0008 ADMIN INFLUENZA VIRUS VAC: HCPCS | Performed by: PHYSICIAN ASSISTANT

## 2019-10-15 PROCEDURE — 90653 IIV ADJUVANT VACCINE IM: CPT | Performed by: PHYSICIAN ASSISTANT

## 2019-10-15 RX ORDER — CYANOCOBALAMIN 1000 UG/ML
1000 INJECTION INTRAMUSCULAR; SUBCUTANEOUS ONCE
Status: CANCELLED | OUTPATIENT
Start: 2019-10-15 | End: 2019-10-15

## 2019-10-15 NOTE — PROGRESS NOTES
Patient presents for flu vaccine and B12 injection. There is no order for the patient's B12 in EPIC    Influenza vaccine given. Advised to contact PCP office for B12 order.

## 2019-10-16 ENCOUNTER — NURSE ONLY (OUTPATIENT)
Dept: FAMILY MEDICINE CLINIC | Facility: CLINIC | Age: 69
End: 2019-10-16
Payer: MEDICARE

## 2019-10-16 DIAGNOSIS — E53.8 VITAMIN B12 DEFICIENCY: Primary | ICD-10-CM

## 2019-10-16 PROCEDURE — 96372 THER/PROPH/DIAG INJ SC/IM: CPT | Performed by: FAMILY MEDICINE

## 2019-10-16 RX ORDER — CYANOCOBALAMIN 1000 UG/ML
1000 INJECTION INTRAMUSCULAR; SUBCUTANEOUS ONCE
Status: COMPLETED | OUTPATIENT
Start: 2019-10-16 | End: 2019-10-16

## 2019-10-16 RX ADMIN — CYANOCOBALAMIN 1000 MCG: 1000 INJECTION INTRAMUSCULAR; SUBCUTANEOUS at 10:19:00

## 2019-10-21 ENCOUNTER — PATIENT OUTREACH (OUTPATIENT)
Dept: CASE MANAGEMENT | Age: 69
End: 2019-10-21

## 2019-10-31 PROCEDURE — 99490 CHRNC CARE MGMT STAFF 1ST 20: CPT

## 2019-11-04 ENCOUNTER — PATIENT OUTREACH (OUTPATIENT)
Dept: CASE MANAGEMENT | Age: 69
End: 2019-11-04

## 2019-11-04 DIAGNOSIS — E11.51 DIABETES MELLITUS WITH PERIPHERAL CIRCULATORY DISORDER (HCC): ICD-10-CM

## 2019-11-04 DIAGNOSIS — I10 ESSENTIAL HYPERTENSION: ICD-10-CM

## 2019-11-04 DIAGNOSIS — E78.5 HYPERLIPIDEMIA, UNSPECIFIED HYPERLIPIDEMIA TYPE: ICD-10-CM

## 2019-11-04 NOTE — PROGRESS NOTES
11/4/2019  Spoke to Tiffany for CCM.       Updates to patient care team/ comments: UTD   Patient reported changes in medications: reports no changes   Med Adherence  Comment: reports taking medications as prescribed     Health Maintenance: REVIEWED   Diabmackenzie kg)  07/02/19 : 217 lb (98.4 kg)  04/02/19 : 219 lb (99.3 kg)  09/13/18 : 221 lb (100.2 kg)  08/31/18 : 220 lb (99.8 kg)    No future appointments.       • Active goal from previous outreach:                       Low carb diet/ weight loss - patient relate

## 2019-11-13 RX ORDER — CLOPIDOGREL BISULFATE 75 MG/1
TABLET ORAL
Qty: 90 TABLET | Refills: 3 | Status: SHIPPED | OUTPATIENT
Start: 2019-11-13

## 2019-11-13 RX ORDER — LEVOTHYROXINE SODIUM 88 UG/1
TABLET ORAL
Qty: 90 TABLET | Refills: 3 | Status: SHIPPED | OUTPATIENT
Start: 2019-11-13

## 2019-11-13 NOTE — TELEPHONE ENCOUNTER
Medication(s) to Refill:   Requested Prescriptions     Pending Prescriptions Disp Refills   • METFORMIN HCL 1000 MG Oral Tab [Pharmacy Med Name: METFORMIN HYDROCHLORIDE 1000 MG Tablet] 180 tablet 0     Sig: TAKE 1 TABLET TWICE DAILY WITH MEALS   • LEVOTHYR

## 2019-11-14 ENCOUNTER — NURSE ONLY (OUTPATIENT)
Dept: FAMILY MEDICINE CLINIC | Facility: CLINIC | Age: 69
End: 2019-11-14
Payer: MEDICARE

## 2019-11-14 DIAGNOSIS — E53.8 B12 DEFICIENCY: Primary | ICD-10-CM

## 2019-11-14 PROCEDURE — 96372 THER/PROPH/DIAG INJ SC/IM: CPT | Performed by: FAMILY MEDICINE

## 2019-11-14 RX ORDER — CYANOCOBALAMIN 1000 UG/ML
1000 INJECTION INTRAMUSCULAR; SUBCUTANEOUS ONCE
Status: COMPLETED | OUTPATIENT
Start: 2019-11-14 | End: 2019-11-14

## 2019-11-14 RX ADMIN — CYANOCOBALAMIN 1000 MCG: 1000 INJECTION INTRAMUSCULAR; SUBCUTANEOUS at 10:46:00

## 2019-11-19 RX ORDER — OXYBUTYNIN CHLORIDE 15 MG/1
15 TABLET, EXTENDED RELEASE ORAL DAILY
Qty: 90 TABLET | Refills: 2 | Status: SHIPPED | OUTPATIENT
Start: 2019-11-19

## 2019-11-19 NOTE — TELEPHONE ENCOUNTER
Last office visit:   9/18/19    Appointment scheduled with: n/a    Requested medication:   Oxybutynin Chloride ER 15 MG Oral Tablet 24 Hr 90 tablet 3 7/26/2018         Pharmacy: [address] [place] [address] [phone]

## 2019-11-19 NOTE — TELEPHONE ENCOUNTER
Medication(s) to Refill:   Requested Prescriptions     Pending Prescriptions Disp Refills   • Oxybutynin Chloride ER 15 MG Oral Tablet 24 Hr 90 tablet 0     Sig: Take 1 tablet (15 mg total) by mouth daily.          Reason for Medication Refill being sent to

## 2019-11-30 PROCEDURE — 99490 CHRNC CARE MGMT STAFF 1ST 20: CPT

## 2019-12-02 ENCOUNTER — PATIENT OUTREACH (OUTPATIENT)
Dept: CASE MANAGEMENT | Age: 69
End: 2019-12-02

## 2019-12-03 ENCOUNTER — PATIENT OUTREACH (OUTPATIENT)
Dept: CASE MANAGEMENT | Age: 69
End: 2019-12-03

## 2019-12-03 DIAGNOSIS — M47.816 LUMBAR SPONDYLOSIS: ICD-10-CM

## 2019-12-03 DIAGNOSIS — E78.5 HYPERLIPIDEMIA, UNSPECIFIED HYPERLIPIDEMIA TYPE: ICD-10-CM

## 2019-12-03 DIAGNOSIS — M48.061 SPINAL STENOSIS OF LUMBAR REGION, UNSPECIFIED WHETHER NEUROGENIC CLAUDICATION PRESENT: ICD-10-CM

## 2019-12-03 DIAGNOSIS — E11.9 TYPE 2 DIABETES MELLITUS WITHOUT COMPLICATION, WITHOUT LONG-TERM CURRENT USE OF INSULIN (HCC): ICD-10-CM

## 2019-12-03 DIAGNOSIS — M43.16 SPONDYLOLISTHESIS OF LUMBAR REGION: ICD-10-CM

## 2019-12-03 NOTE — PROGRESS NOTES
12/3/2019  Spoke to Tiffany for CCM.       Updates to patient care team/ comments: UTD   Patient reported changes in medications: reports no changes   Med Adherence  Comment: reports taking medications as prescribed      Health Maintenance: REVIEWED   Diabet weight loss - patient relates wanting to reach 195 then reset a new goal.          • Patient reported progress toward goal:  Partially progressing       • Update to previous barriers: holidays are over and feels is back on track     • Patient Reported New

## 2019-12-12 RX ORDER — OMEPRAZOLE 20 MG/1
CAPSULE, DELAYED RELEASE ORAL
Qty: 180 CAPSULE | Refills: 0 | Status: SHIPPED | OUTPATIENT
Start: 2019-12-12 | End: 2020-03-05

## 2019-12-12 NOTE — TELEPHONE ENCOUNTER
Medication(s) to Refill:   Requested Prescriptions     Pending Prescriptions Disp Refills   • OMEPRAZOLE 20 MG Oral Capsule Delayed Release [Pharmacy Med Name: OMEPRAZOLE 20 MG Capsule Delayed Release] 180 capsule 0     Sig: TAKE 1 CAPSULE TWICE DAILY

## 2019-12-31 PROCEDURE — 99490 CHRNC CARE MGMT STAFF 1ST 20: CPT

## 2020-01-07 ENCOUNTER — PATIENT OUTREACH (OUTPATIENT)
Dept: CASE MANAGEMENT | Age: 70
End: 2020-01-07

## 2020-01-15 ENCOUNTER — APPOINTMENT (OUTPATIENT)
Dept: LAB | Age: 70
End: 2020-01-15
Attending: FAMILY MEDICINE
Payer: MEDICARE

## 2020-01-15 ENCOUNTER — NURSE ONLY (OUTPATIENT)
Dept: FAMILY MEDICINE CLINIC | Facility: CLINIC | Age: 70
End: 2020-01-15
Payer: MEDICARE

## 2020-01-15 DIAGNOSIS — E53.8 VITAMIN B12 DEFICIENCY: ICD-10-CM

## 2020-01-15 DIAGNOSIS — E53.8 VITAMIN B12 DEFICIENCY: Primary | ICD-10-CM

## 2020-01-15 LAB — VIT B12 SERPL-MCNC: 447 PG/ML (ref 193–986)

## 2020-01-15 PROCEDURE — 82607 VITAMIN B-12: CPT

## 2020-01-15 PROCEDURE — 96372 THER/PROPH/DIAG INJ SC/IM: CPT | Performed by: FAMILY MEDICINE

## 2020-01-15 PROCEDURE — 36415 COLL VENOUS BLD VENIPUNCTURE: CPT

## 2020-01-15 RX ORDER — CYANOCOBALAMIN 1000 UG/ML
1000 INJECTION INTRAMUSCULAR; SUBCUTANEOUS ONCE
Status: COMPLETED | OUTPATIENT
Start: 2020-01-15 | End: 2020-01-15

## 2020-01-15 RX ADMIN — CYANOCOBALAMIN 1000 MCG: 1000 INJECTION INTRAMUSCULAR; SUBCUTANEOUS at 10:55:00

## 2020-01-29 ENCOUNTER — PATIENT OUTREACH (OUTPATIENT)
Dept: CASE MANAGEMENT | Age: 70
End: 2020-01-29

## 2020-01-29 DIAGNOSIS — M47.816 LUMBAR SPONDYLOSIS: ICD-10-CM

## 2020-01-29 DIAGNOSIS — E11.9 TYPE 2 DIABETES MELLITUS WITHOUT COMPLICATION, WITHOUT LONG-TERM CURRENT USE OF INSULIN (HCC): ICD-10-CM

## 2020-01-29 DIAGNOSIS — I10 ESSENTIAL HYPERTENSION: ICD-10-CM

## 2020-01-29 DIAGNOSIS — M54.16 LUMBAR RADICULITIS: ICD-10-CM

## 2020-01-29 DIAGNOSIS — G25.0 ESSENTIAL TREMOR: ICD-10-CM

## 2020-01-29 NOTE — PROGRESS NOTES
1/29/2020  Spoke to Tiffany for CCM.       Updates to patient care team/ comments: UTD  Patient reported changes in medications: reports no changes   Med Adherence  Comment: reports taking medications as prescribed      Health Maintenance:   Diabetes Care Leopold Martens updates where needed, education, goals and action plan recreation/update. Provided acknowledgment and validation to patient's concerns.        Physical assessment, complete health history, and care plan established by Brandyn Kee MD, need for CCM determine

## 2020-01-31 PROCEDURE — 99490 CHRNC CARE MGMT STAFF 1ST 20: CPT

## 2020-02-24 ENCOUNTER — PATIENT OUTREACH (OUTPATIENT)
Dept: CASE MANAGEMENT | Age: 70
End: 2020-02-24

## 2020-02-24 DIAGNOSIS — M54.16 LUMBAR RADICULITIS: ICD-10-CM

## 2020-02-24 DIAGNOSIS — G25.0 ESSENTIAL TREMOR: ICD-10-CM

## 2020-02-24 DIAGNOSIS — G40.909 SEIZURE DISORDER (HCC): ICD-10-CM

## 2020-02-24 DIAGNOSIS — I10 ESSENTIAL HYPERTENSION: ICD-10-CM

## 2020-02-24 DIAGNOSIS — E11.9 TYPE 2 DIABETES MELLITUS WITHOUT COMPLICATION, WITHOUT LONG-TERM CURRENT USE OF INSULIN (HCC): ICD-10-CM

## 2020-02-24 NOTE — PROGRESS NOTES
2/24/2020  Spoke to Tiffany for CCM.       Updates to patient care team/ comments: UTD  Patient reported changes in medications: reports no changes   Med Adherence  Comment: reports taking medications as prescribed     Health Maintenance:   Diabetes Care Dil healthy coping and dx. Time Spent This Encounter Total: 22 min medical record review, telephone communication, care plan updates where needed, education, goals and action plan recreation/update.  Provided acknowledgment and validation to patient's conc

## 2020-02-29 PROCEDURE — 99490 CHRNC CARE MGMT STAFF 1ST 20: CPT

## 2020-03-05 NOTE — TELEPHONE ENCOUNTER
LOV: 9/18/19  Omeprazole LF: 12/12/19  Simvastatin LF: 2/11/19  Potassium LF: 8/21/19  Please approve or deny pending Rx. Thank you!

## 2020-03-05 NOTE — TELEPHONE ENCOUNTER
Last office visit:   9/18/19    Appointment scheduled with:      Requested medication:   OMEPRAZOLE 20 MG Oral Capsule Delayed Release  POTASSIUM CHLORIDE ER 10 MEQ Oral Tab CR  SIMVASTATIN 10 MG Oral Tab    Pharmacy: [id number] [place] - [facility]

## 2020-03-06 ENCOUNTER — PATIENT OUTREACH (OUTPATIENT)
Dept: CASE MANAGEMENT | Age: 70
End: 2020-03-06

## 2020-03-06 DIAGNOSIS — E78.5 HYPERLIPIDEMIA, UNSPECIFIED HYPERLIPIDEMIA TYPE: ICD-10-CM

## 2020-03-06 DIAGNOSIS — G25.0 ESSENTIAL TREMOR: ICD-10-CM

## 2020-03-06 DIAGNOSIS — E11.9 TYPE 2 DIABETES MELLITUS WITHOUT COMPLICATION, WITHOUT LONG-TERM CURRENT USE OF INSULIN (HCC): ICD-10-CM

## 2020-03-06 DIAGNOSIS — M54.16 LUMBAR RADICULITIS: ICD-10-CM

## 2020-03-06 RX ORDER — OMEPRAZOLE 20 MG/1
CAPSULE, DELAYED RELEASE ORAL
Qty: 180 CAPSULE | Refills: 0 | Status: SHIPPED | OUTPATIENT
Start: 2020-03-06

## 2020-03-06 RX ORDER — SIMVASTATIN 10 MG
TABLET ORAL
Qty: 90 TABLET | Refills: 0 | Status: SHIPPED | OUTPATIENT
Start: 2020-03-06

## 2020-03-06 RX ORDER — POTASSIUM CHLORIDE 750 MG/1
TABLET, EXTENDED RELEASE ORAL
Qty: 180 TABLET | Refills: 0 | Status: SHIPPED | OUTPATIENT
Start: 2020-03-06

## 2020-03-06 NOTE — PROGRESS NOTES
3/6/2020  Spoke to Tiffany for CCM.       Updates to patient care team/ comments: UTD  Patient reported changes in medications: reports no changes  Med Adherence  Comment: reports taking medications as prescribed       Patient current concerns:     Spoke wit

## 2020-03-25 ENCOUNTER — TELEPHONE (OUTPATIENT)
Dept: FAMILY MEDICINE CLINIC | Facility: CLINIC | Age: 70
End: 2020-03-25

## 2020-03-25 ENCOUNTER — PATIENT OUTREACH (OUTPATIENT)
Dept: CASE MANAGEMENT | Age: 70
End: 2020-03-25

## 2020-03-25 DIAGNOSIS — E11.9 TYPE 2 DIABETES MELLITUS WITHOUT COMPLICATION, WITHOUT LONG-TERM CURRENT USE OF INSULIN (HCC): ICD-10-CM

## 2020-03-25 DIAGNOSIS — M54.16 LUMBAR RADICULITIS: ICD-10-CM

## 2020-03-25 DIAGNOSIS — I10 ESSENTIAL HYPERTENSION: ICD-10-CM

## 2020-03-25 NOTE — TELEPHONE ENCOUNTER
Patient called me. Patient states she has found a new doctor in [place] but does not have an appointment until 04/02. Patient requesting to know if she should start an over the counter B12 supplement? She relates she usually gets the B12 injections.    Anthony Fallon

## 2020-03-25 NOTE — PROGRESS NOTES
Patient called me. Patient states she has found a new doctor in [place] but does not have an appointment until 04/02. Patient requesting to know if she should start an over the counter B12 supplement? She relates she usually gets the B12 injections.    Raghavendra Ellington

## 2020-03-25 NOTE — TELEPHONE ENCOUNTER
Discussed plan with Dr. Demarcus Hatfield    OTC 1000mcg vitamin B12 daily    Calvin Boykin- LITTLE student

## 2020-03-31 PROCEDURE — 99490 CHRNC CARE MGMT STAFF 1ST 20: CPT

## 2020-05-18 RX ORDER — POTASSIUM CHLORIDE 750 MG/1
TABLET, EXTENDED RELEASE ORAL
Qty: 180 TABLET | Refills: 0 | OUTPATIENT
Start: 2020-05-18

## 2020-05-18 RX ORDER — GLIPIZIDE 10 MG/1
TABLET, FILM COATED, EXTENDED RELEASE ORAL
Qty: 90 TABLET | Refills: 0 | OUTPATIENT
Start: 2020-05-18

## 2020-05-18 NOTE — TELEPHONE ENCOUNTER
She was planning on moving I think   She is due for repeat labs jennifer if we are to cont refilling potassium medication. If she has new PCP - she can ask pharmacy to send request to new PCP.

## 2020-11-22 NOTE — ADDENDUM NOTE
Addended by: Niya Peres on: 11/29/2018 10:38 AM     Modules accepted: Jez RITESH Patel MD  11/22/20 [id number]

## 2020-12-12 NOTE — TELEPHONE ENCOUNTER
Medication(s) to Refill:   Requested Prescriptions     Pending Prescriptions Disp Refills   • OMEPRAZOLE 20 MG Oral Capsule Delayed Release [Pharmacy Med Name: OMEPRAZOLE 20 MG Capsule Delayed Release] 180 capsule 1     Sig: TAKE 1 CAPSULE TWICE DAILY The wire is inserted in the. distal circumflex artery.

## 2021-02-19 RX ORDER — OXYBUTYNIN CHLORIDE 15 MG/1
TABLET, EXTENDED RELEASE ORAL
Qty: 90 TABLET | Refills: 1 | OUTPATIENT
Start: 2021-02-19

## (undated) NOTE — LETTER
11/04/19  Radha [place] [id number]      Dear So Vicente: It was a pleasure speaking to you over the phone recently. I have enclosed some information that you may find helpful. I look forward to talking with you in the near future.

## (undated) NOTE — LETTER
05/02/19        [place] [place] [facility] [address]      Dear Latonia Armstrong,    [facility] records indicate that you have outstanding lab work and or testing that was ordered for you and has not yet been completed:  Orders Placed This Encounter          MA

## (undated) NOTE — Clinical Note
Dear Dr. Sandra Faulkner,       Thank you for referring Marc Major to the [facility].   Sincerely,  RIVKA Park

## (undated) NOTE — LETTER
08/31/17        [address] [place] [address]      Dear Chilango Rodriguez,    [facility] records indicate that you have outstanding lab work and or testing that was ordered for you and has not yet been completed:          Hemoglobin A1C [E]      Lipid Pa

## (undated) NOTE — MR AVS SNAPSHOT
[address] [facility] [address] [phone]               Thank you for choosing us for your health care visit with ANIBAL Mcdaniel.   We are glad to serve you and happy to provide you with this summary of you Levothyroxine Sodium 50 MCG Tabs   TAKE 1 TABLET EVERY DAY BEFORE BREAKFAST   Commonly known as:  SYNTHROID           MetFORMIN HCl 1000 MG Tabs   TAKE 1 TABLET TWICE DAILY WITH MEALS   Commonly known as:  GLUCOPHAGE           omeprazole 20 MG Cpdr   Comm Eat plenty of protein, keep the fat content low Sugars:  sodas and sports drinks, candies and desserts   Eat plenty of low-fat dairy products High fat meats and dairy   Choose whole grain products Foods high in sodium   Water is best for hydration Fast wagner

## (undated) NOTE — MR AVS SNAPSHOT
[address] [address] [phone]               Thank you for choosing us for your health care visit with Ema Pryor MD.  We are glad to serve you and happy to provide you with this summary of your schedule your appointment. If you are confident that your benefit plan will not require a referral or authorization, such as [place] Medicaid, please feel free to schedule your appointment immediately.  However, if you are unsure about the requirements BP Pulse Weight             130/80 mmHg 80 221 lb            Current Medications          This list is accurate as of: 5/16/17 12:08 PM.  Always use your most recent med list.                Acetaminophen-Codeine #3 300-30 MG Tabs   Take 1 tablet by mouth Summaries. If you've been to the Emergency Department or your doctor's office, you can view your past visit information in [facility] by going to Visits < Visit Summaries. [facility] questions? Call [phone] for help.   [facility] is NOT to be used for urge

## (undated) NOTE — LETTER
05/23/18        [address] [place] [address]      Dear Ulysses Malay,    [facility] records indicate that you have outstanding lab work and or testing that was ordered for you and has not yet been completed:          Hemoglobin A1C [E]      TSH and

## (undated) NOTE — LETTER
11/07/19  [place] [address]      Dear Malina Bull: It was a pleasure speaking to you over the phone recently. I have enclosed some information that you may find helpful. I look forward to talking with you in the near future.

## (undated) NOTE — Clinical Note
March 30, 2017    [place] [address]      Dear Francisco Rojo: It was a pleasure speaking with you over the phone recently.  To follow up, I wanted to send you my contact information to utilize when you have a question and or need some allow us to bill for these services during any month that we have provided at least 20 minutes of non face-to-face care of you and your conditions. You will have a Nurse Care Manager overseeing your care.   Sometimes other staff from our practice will ta ask your Nurse Care Manager for the Chronic Care Management Services Termination form.        Our goal is to provide you with the best care possible, to keep you out of the hospital, and to minimize costs and inconvenience to you due to unnecessary visits t

## (undated) NOTE — LETTER
12/31/18        Jos[address] [place] [address]      Dear Adria Fabry, [facility] records indicate that you have outstanding lab work and or testing that was ordered for you and has not yet been completed:  Orders Placed This Encounter         Lip

## (undated) NOTE — MR AVS SNAPSHOT
[address] [facility] [address] [phone]               Thank you for choosing us for your health care visit with Emperatriz Contreras MD.  We are glad to serve you and happy to provide you with this summary of your TAKE TWO TABLETS BY MOUTH EVERY DAY   Commonly known as:  K-DUR           Sertraline HCl 25 MG Tabs   TAKE 1 TABLET EVERY DAY   Commonly known as:  ZOLOFT           simvastatin 10 MG Tabs   TAKE 1 TABLET EVERY NIGHT   Commonly known as:  ZOCOR           Va

## (undated) NOTE — MR AVS SNAPSHOT
[address] [facility] [address] [phone]               Thank you for choosing us for your health care visit with Jannifer Crigler, MD.  We are glad to serve you and happy to provide you with this summary of your GlipiZIDE ER 10 MG Tb24   TAKE 1 TABLET EVERY DAY   Commonly known as:  GLIPIZIDE XL           Glucose Blood Strp   Test once daily.    Commonly known as:  ONETOUCH ULTRA BLUE           Levothyroxine Sodium 75 MCG Tabs   Take 1 tablet (75 mcg total) by kayleigh discharge instructions in [facility] by going to Visits < Admission Summaries. If you've been to the Emergency Department or your doctor's office, you can view your past visit information in [facility] by going to Visits < Visit Summaries. [employer] questions?